# Patient Record
Sex: FEMALE | Race: WHITE | NOT HISPANIC OR LATINO | Employment: UNEMPLOYED | URBAN - NONMETROPOLITAN AREA
[De-identification: names, ages, dates, MRNs, and addresses within clinical notes are randomized per-mention and may not be internally consistent; named-entity substitution may affect disease eponyms.]

---

## 2018-01-01 ENCOUNTER — APPOINTMENT (OUTPATIENT)
Dept: GENERAL RADIOLOGY | Facility: HOSPITAL | Age: 73
End: 2018-01-01

## 2018-01-01 ENCOUNTER — HOSPITAL ENCOUNTER (INPATIENT)
Facility: HOSPITAL | Age: 73
LOS: 8 days | End: 2018-03-21
Attending: EMERGENCY MEDICINE | Admitting: INTERNAL MEDICINE

## 2018-01-01 ENCOUNTER — APPOINTMENT (OUTPATIENT)
Dept: CT IMAGING | Facility: HOSPITAL | Age: 73
End: 2018-01-01

## 2018-01-01 ENCOUNTER — HOSPITAL ENCOUNTER (OUTPATIENT)
Facility: HOSPITAL | Age: 73
Setting detail: HOSPITAL OUTPATIENT SURGERY
End: 2018-01-01
Attending: THORACIC SURGERY (CARDIOTHORACIC VASCULAR SURGERY) | Admitting: THORACIC SURGERY (CARDIOTHORACIC VASCULAR SURGERY)

## 2018-01-01 ENCOUNTER — EPISODE CHANGES (OUTPATIENT)
Dept: CASE MANAGEMENT | Facility: OTHER | Age: 73
End: 2018-01-01

## 2018-01-01 ENCOUNTER — PATIENT OUTREACH (OUTPATIENT)
Dept: CASE MANAGEMENT | Facility: OTHER | Age: 73
End: 2018-01-01

## 2018-01-01 ENCOUNTER — HOSPITAL ENCOUNTER (OUTPATIENT)
Dept: CT IMAGING | Facility: HOSPITAL | Age: 73
End: 2018-01-01
Attending: THORACIC SURGERY (CARDIOTHORACIC VASCULAR SURGERY)

## 2018-01-01 ENCOUNTER — PREP FOR SURGERY (OUTPATIENT)
Dept: OTHER | Facility: HOSPITAL | Age: 73
End: 2018-01-01

## 2018-01-01 ENCOUNTER — APPOINTMENT (OUTPATIENT)
Dept: CARDIOLOGY | Facility: HOSPITAL | Age: 73
End: 2018-01-01

## 2018-01-01 ENCOUNTER — OFFICE VISIT (OUTPATIENT)
Dept: CARDIAC SURGERY | Facility: CLINIC | Age: 73
End: 2018-01-01

## 2018-01-01 ENCOUNTER — APPOINTMENT (OUTPATIENT)
Dept: PREADMISSION TESTING | Facility: HOSPITAL | Age: 73
End: 2018-01-01

## 2018-01-01 ENCOUNTER — HOSPITAL ENCOUNTER (INPATIENT)
Facility: HOSPITAL | Age: 73
LOS: 2 days | Discharge: HOME HEALTH | End: 2018-03-10
Attending: INTERNAL MEDICINE | Admitting: HOSPITALIST

## 2018-01-01 ENCOUNTER — HOSPITAL ENCOUNTER (EMERGENCY)
Facility: HOSPITAL | Age: 73
Discharge: HOME | End: 2018-02-08
Attending: EMERGENCY MEDICINE | Admitting: EMERGENCY MEDICINE

## 2018-01-01 ENCOUNTER — TRANSCRIBE ORDERS (OUTPATIENT)
Dept: CARDIAC SURGERY | Facility: CLINIC | Age: 73
End: 2018-01-01

## 2018-01-01 VITALS
SYSTOLIC BLOOD PRESSURE: 120 MMHG | HEART RATE: 109 BPM | BODY MASS INDEX: 20.92 KG/M2 | HEIGHT: 62 IN | OXYGEN SATURATION: 94 % | DIASTOLIC BLOOD PRESSURE: 88 MMHG | WEIGHT: 113.7 LBS

## 2018-01-01 VITALS
HEIGHT: 62 IN | BODY MASS INDEX: 22.97 KG/M2 | OXYGEN SATURATION: 95 % | RESPIRATION RATE: 16 BRPM | DIASTOLIC BLOOD PRESSURE: 86 MMHG | WEIGHT: 124.8 LBS | TEMPERATURE: 98.5 F | SYSTOLIC BLOOD PRESSURE: 120 MMHG | HEART RATE: 80 BPM

## 2018-01-01 VITALS
WEIGHT: 113.6 LBS | HEIGHT: 62 IN | OXYGEN SATURATION: 93 % | DIASTOLIC BLOOD PRESSURE: 62 MMHG | BODY MASS INDEX: 20.91 KG/M2 | SYSTOLIC BLOOD PRESSURE: 74 MMHG | HEART RATE: 80 BPM | TEMPERATURE: 97.6 F

## 2018-01-01 VITALS
BODY MASS INDEX: 14.73 KG/M2 | HEART RATE: 76 BPM | WEIGHT: 78.04 LBS | DIASTOLIC BLOOD PRESSURE: 78 MMHG | OXYGEN SATURATION: 97 % | HEIGHT: 61 IN | TEMPERATURE: 98 F | RESPIRATION RATE: 18 BRPM | SYSTOLIC BLOOD PRESSURE: 140 MMHG

## 2018-01-01 DIAGNOSIS — J96.01 ACUTE RESPIRATORY FAILURE WITH HYPOXIA (HCC): ICD-10-CM

## 2018-01-01 DIAGNOSIS — J96.02 ACUTE RESPIRATORY FAILURE WITH HYPOXIA AND HYPERCAPNIA (HCC): ICD-10-CM

## 2018-01-01 DIAGNOSIS — J90 PLEURAL EFFUSION: ICD-10-CM

## 2018-01-01 DIAGNOSIS — J98.59 MEDIASTINAL MASS: Primary | ICD-10-CM

## 2018-01-01 DIAGNOSIS — R68.89 SUSPECTED DEEP TISSUE INJURY: ICD-10-CM

## 2018-01-01 DIAGNOSIS — J96.01 ACUTE RESPIRATORY FAILURE WITH HYPOXIA AND HYPERCAPNIA (HCC): ICD-10-CM

## 2018-01-01 DIAGNOSIS — Z74.09 IMPAIRED FUNCTIONAL MOBILITY, BALANCE, GAIT, AND ENDURANCE: Primary | ICD-10-CM

## 2018-01-01 DIAGNOSIS — J96.01 ACUTE HYPOXEMIC RESPIRATORY FAILURE (HCC): ICD-10-CM

## 2018-01-01 DIAGNOSIS — R22.2 CHEST MASS: Primary | ICD-10-CM

## 2018-01-01 DIAGNOSIS — J98.59 DISEASE OF MEDIASTINUM: Primary | ICD-10-CM

## 2018-01-01 DIAGNOSIS — J44.1 ACUTE EXACERBATION OF CHRONIC OBSTRUCTIVE PULMONARY DISEASE (COPD) (HCC): ICD-10-CM

## 2018-01-01 DIAGNOSIS — I50.9 ACUTE CONGESTIVE HEART FAILURE, UNSPECIFIED CONGESTIVE HEART FAILURE TYPE: ICD-10-CM

## 2018-01-01 DIAGNOSIS — J18.9 HEALTHCARE-ASSOCIATED PNEUMONIA: ICD-10-CM

## 2018-01-01 DIAGNOSIS — J44.1 CHRONIC OBSTRUCTIVE PULMONARY DISEASE WITH ACUTE EXACERBATION (HCC): ICD-10-CM

## 2018-01-01 DIAGNOSIS — J20.8 ACUTE BACTERIAL BRONCHITIS: Primary | ICD-10-CM

## 2018-01-01 DIAGNOSIS — R22.2 CHEST MASS: ICD-10-CM

## 2018-01-01 DIAGNOSIS — B96.89 ACUTE BACTERIAL BRONCHITIS: Primary | ICD-10-CM

## 2018-01-01 DIAGNOSIS — E43 SEVERE MALNUTRITION (HCC): ICD-10-CM

## 2018-01-01 LAB
027 TOXIN: NORMAL
A-A DO2: 10.9 MMHG (ref 0–300)
A-A DO2: 141.7 MMHG (ref 0–300)
A-A DO2: 142 MMHG (ref 0–300)
A-A DO2: 160.8 MMHG (ref 0–300)
A-A DO2: 163.7 MMHG (ref 0–300)
A-A DO2: 184 MMHG (ref 0–300)
A-A DO2: 19.4 MMHG (ref 0–300)
A-A DO2: 194.3 MMHG (ref 0–300)
A-A DO2: 69.7 MMHG (ref 0–300)
A-A DO2: 78.3 MMHG (ref 0–300)
A-A DO2: 78.7 MMHG (ref 0–300)
A-A DO2: 86.8 MMHG (ref 0–300)
A-A DO2: 88.5 MMHG (ref 0–300)
A-A DO2: 89.2 MMHG (ref 0–300)
A-A DO2: 93.9 MMHG (ref 0–300)
ALBUMIN FLD-MCNC: 1.2 G/DL
ALBUMIN SERPL-MCNC: 2.2 G/DL (ref 3.4–4.8)
ALBUMIN SERPL-MCNC: 2.3 G/DL (ref 3.4–4.8)
ALBUMIN SERPL-MCNC: 2.5 G/DL (ref 3.2–4.8)
ALBUMIN SERPL-MCNC: 2.5 G/DL (ref 3.4–4.8)
ALBUMIN SERPL-MCNC: 2.6 G/DL (ref 3.4–4.8)
ALBUMIN SERPL-MCNC: 2.6 G/DL (ref 3.4–4.8)
ALBUMIN SERPL-MCNC: 2.7 G/DL (ref 3.2–4.8)
ALBUMIN SERPL-MCNC: 2.8 G/DL (ref 3.4–4.8)
ALBUMIN SERPL-MCNC: 3 G/DL (ref 3.4–4.8)
ALBUMIN SERPL-MCNC: 3.3 G/DL (ref 3.2–4.8)
ALBUMIN SERPL-MCNC: 4.6 G/DL (ref 3.4–4.8)
ALBUMIN/GLOB SERPL: 0.9 G/DL (ref 1.5–2.5)
ALBUMIN/GLOB SERPL: 0.9 G/DL (ref 1.5–2.5)
ALBUMIN/GLOB SERPL: 1 G/DL (ref 1.5–2.5)
ALBUMIN/GLOB SERPL: 1.1 G/DL (ref 1.5–2.5)
ALBUMIN/GLOB SERPL: 1.1 G/DL (ref 1.5–2.5)
ALBUMIN/GLOB SERPL: 1.2 G/DL (ref 1.5–2.5)
ALBUMIN/GLOB SERPL: 1.4 G/DL (ref 1.5–2.5)
ALP SERPL-CCNC: 114 U/L (ref 25–100)
ALP SERPL-CCNC: 131 U/L (ref 35–104)
ALP SERPL-CCNC: 70 U/L (ref 35–104)
ALP SERPL-CCNC: 74 U/L (ref 35–104)
ALP SERPL-CCNC: 76 U/L (ref 35–104)
ALP SERPL-CCNC: 80 U/L (ref 35–104)
ALP SERPL-CCNC: 80 U/L (ref 35–104)
ALP SERPL-CCNC: 81 U/L (ref 35–104)
ALP SERPL-CCNC: 86 U/L (ref 35–104)
ALP SERPL-CCNC: 92 U/L (ref 25–100)
ALT SERPL W P-5'-P-CCNC: 108 U/L (ref 10–36)
ALT SERPL W P-5'-P-CCNC: 48 U/L (ref 10–36)
ALT SERPL W P-5'-P-CCNC: 59 U/L (ref 10–36)
ALT SERPL W P-5'-P-CCNC: 61 U/L (ref 10–36)
ALT SERPL W P-5'-P-CCNC: 64 U/L (ref 10–36)
ALT SERPL W P-5'-P-CCNC: 69 U/L (ref 7–40)
ALT SERPL W P-5'-P-CCNC: 74 U/L (ref 10–36)
ALT SERPL W P-5'-P-CCNC: 81 U/L (ref 10–36)
ALT SERPL W P-5'-P-CCNC: 86 U/L (ref 10–36)
ALT SERPL W P-5'-P-CCNC: 88 U/L (ref 7–40)
AMORPH URATE CRY URNS QL MICRO: ABNORMAL /HPF
ANION GAP SERPL CALCULATED.3IONS-SCNC: 2.6 MMOL/L (ref 3.6–11.2)
ANION GAP SERPL CALCULATED.3IONS-SCNC: 3 MMOL/L (ref 3–11)
ANION GAP SERPL CALCULATED.3IONS-SCNC: 4 MMOL/L (ref 3.6–11.2)
ANION GAP SERPL CALCULATED.3IONS-SCNC: 5.6 MMOL/L (ref 3.6–11.2)
ANION GAP SERPL CALCULATED.3IONS-SCNC: 6 MMOL/L (ref 3–11)
ANION GAP SERPL CALCULATED.3IONS-SCNC: 6.5 MMOL/L (ref 3.6–11.2)
ANION GAP SERPL CALCULATED.3IONS-SCNC: 6.7 MMOL/L (ref 3.6–11.2)
ANION GAP SERPL CALCULATED.3IONS-SCNC: 7.4 MMOL/L (ref 3.6–11.2)
ANION GAP SERPL CALCULATED.3IONS-SCNC: 7.7 MMOL/L (ref 3.6–11.2)
ANION GAP SERPL CALCULATED.3IONS-SCNC: 8 MMOL/L (ref 3–11)
ANION GAP SERPL CALCULATED.3IONS-SCNC: 8.2 MMOL/L (ref 3.6–11.2)
APPEARANCE FLD: ABNORMAL
APTT PPP: 20.5 SECONDS (ref 23.8–36.1)
APTT PPP: 26.6 SECONDS (ref 24–31)
ARTERIAL PATENCY WRIST A: ABNORMAL
ARTERIAL PATENCY WRIST A: POSITIVE
AST SERPL-CCNC: 28 U/L (ref 10–30)
AST SERPL-CCNC: 29 U/L (ref 10–30)
AST SERPL-CCNC: 29 U/L (ref 10–30)
AST SERPL-CCNC: 40 U/L (ref 10–30)
AST SERPL-CCNC: 42 U/L (ref 10–30)
AST SERPL-CCNC: 49 U/L (ref 10–30)
AST SERPL-CCNC: 52 U/L (ref 0–33)
AST SERPL-CCNC: 52 U/L (ref 10–30)
AST SERPL-CCNC: 54 U/L (ref 0–33)
AST SERPL-CCNC: 91 U/L (ref 10–30)
ATMOSPHERIC PRESS: 722 MMHG
ATMOSPHERIC PRESS: 723 MMHG
ATMOSPHERIC PRESS: 726 MMHG
ATMOSPHERIC PRESS: 727 MMHG
ATMOSPHERIC PRESS: 727 MMHG
ATMOSPHERIC PRESS: 729 MMHG
ATMOSPHERIC PRESS: 732 MMHG
BACTERIA FLD CULT: NORMAL
BACTERIA SPEC AEROBE CULT: ABNORMAL
BACTERIA SPEC AEROBE CULT: ABNORMAL
BACTERIA SPEC AEROBE CULT: NO GROWTH
BACTERIA SPEC AEROBE CULT: NORMAL
BACTERIA SPEC ANAEROBE CULT: NORMAL
BACTERIA SPEC RESP CULT: ABNORMAL
BACTERIA UR QL AUTO: ABNORMAL /HPF
BACTERIA UR QL AUTO: ABNORMAL /HPF
BACTERIA UR QL AUTO: NORMAL /HPF
BASE EXCESS BLDA CALC-SCNC: -0.4 MMOL/L
BASE EXCESS BLDA CALC-SCNC: -2.4 MMOL/L
BASE EXCESS BLDA CALC-SCNC: -2.7 MMOL/L
BASE EXCESS BLDA CALC-SCNC: -4 MMOL/L
BASE EXCESS BLDA CALC-SCNC: -5.1 MMOL/L
BASE EXCESS BLDA CALC-SCNC: 0.7 MMOL/L
BASE EXCESS BLDA CALC-SCNC: 3.4 MMOL/L
BASE EXCESS BLDA CALC-SCNC: 6.8 MMOL/L
BASE EXCESS BLDA CALC-SCNC: 7.1 MMOL/L
BASE EXCESS BLDA CALC-SCNC: 7.2 MMOL/L
BASE EXCESS BLDA CALC-SCNC: 7.3 MMOL/L
BASE EXCESS BLDA CALC-SCNC: 7.4 MMOL/L
BASE EXCESS BLDA CALC-SCNC: 8.1 MMOL/L
BASE EXCESS BLDV CALC-SCNC: -5.7 MMOL/L
BASE EXCESS BLDV CALC-SCNC: 5.9 MMOL/L
BASOPHILS # BLD AUTO: 0 10*3/MM3 (ref 0–0.3)
BASOPHILS # BLD AUTO: 0.01 10*3/MM3 (ref 0–0.3)
BASOPHILS # BLD AUTO: 0.02 10*3/MM3 (ref 0–0.2)
BASOPHILS # BLD AUTO: 0.03 10*3/MM3 (ref 0–0.3)
BASOPHILS NFR BLD AUTO: 0 % (ref 0–2)
BASOPHILS NFR BLD AUTO: 0.1 % (ref 0–2)
BASOPHILS NFR BLD AUTO: 0.2 % (ref 0–2)
BASOPHILS NFR BLD AUTO: 0.3 % (ref 0–1)
BDY SITE: ABNORMAL
BDY SITE: NORMAL
BDY SITE: NORMAL
BH CV ECHO MEAS - BSA(HAYCOCK): 1.5 M^2
BH CV ECHO MEAS - BSA: 1.5 M^2
BH CV ECHO MEAS - BZI_BMI: 21.8 KILOGRAMS/M^2
BH CV ECHO MEAS - BZI_METRIC_HEIGHT: 157.5 CM
BH CV ECHO MEAS - BZI_METRIC_WEIGHT: 54 KG
BH CV ECHO MEAS - CONTRAST EF (2CH): 57.9 ML/M^2
BH CV ECHO MEAS - CONTRAST EF 4CH: 62.5 ML/M^2
BH CV ECHO MEAS - EDV(CUBED): 22.3 ML
BH CV ECHO MEAS - EDV(MOD-SP2): 57 ML
BH CV ECHO MEAS - EDV(MOD-SP4): 56 ML
BH CV ECHO MEAS - EDV(TEICH): 30 ML
BH CV ECHO MEAS - EF(CUBED): 74.2 %
BH CV ECHO MEAS - EF(MOD-SP2): 57.9 %
BH CV ECHO MEAS - EF(MOD-SP4): 62 %
BH CV ECHO MEAS - EF(TEICH): 67.9 %
BH CV ECHO MEAS - ESV(CUBED): 5.8 ML
BH CV ECHO MEAS - ESV(MOD-SP2): 24 ML
BH CV ECHO MEAS - ESV(MOD-SP4): 21 ML
BH CV ECHO MEAS - ESV(TEICH): 9.6 ML
BH CV ECHO MEAS - FS: 36.3 %
BH CV ECHO MEAS - IVS/LVPW: 0.99
BH CV ECHO MEAS - IVSD: 1.3 CM
BH CV ECHO MEAS - LAT PEAK E' VEL: 6.6 CM/SEC
BH CV ECHO MEAS - LV DIASTOLIC VOL/BSA (35-75): 36.5 ML/M^2
BH CV ECHO MEAS - LV MASS(C)D: 108.2 GRAMS
BH CV ECHO MEAS - LV MASS(C)DI: 70.6 GRAMS/M^2
BH CV ECHO MEAS - LV SYSTOLIC VOL/BSA (12-30): 13.7 ML/M^2
BH CV ECHO MEAS - LVIDD: 2.8 CM
BH CV ECHO MEAS - LVIDS: 1.8 CM
BH CV ECHO MEAS - LVLD AP2: 5.7 CM
BH CV ECHO MEAS - LVLD AP4: 5.8 CM
BH CV ECHO MEAS - LVLS AP2: 5 CM
BH CV ECHO MEAS - LVLS AP4: 4.8 CM
BH CV ECHO MEAS - LVPWD: 1.3 CM
BH CV ECHO MEAS - MED PEAK E' VEL: 5.7 CM/SEC
BH CV ECHO MEAS - MV A MAX VEL: 77 CM/SEC
BH CV ECHO MEAS - MV DEC SLOPE: 483.8 CM/SEC^2
BH CV ECHO MEAS - MV DEC TIME: 0.2 SEC
BH CV ECHO MEAS - MV E MAX VEL: 59.7 CM/SEC
BH CV ECHO MEAS - MV E/A: 0.78
BH CV ECHO MEAS - PA ACC SLOPE: 623 CM/SEC^2
BH CV ECHO MEAS - PA ACC TIME: 0.09 SEC
BH CV ECHO MEAS - PA PR(ACCEL): 39.8 MMHG
BH CV ECHO MEAS - RAP SYSTOLE: 10 MMHG
BH CV ECHO MEAS - RVSP: 44 MMHG
BH CV ECHO MEAS - SI(CUBED): 10.8 ML/M^2
BH CV ECHO MEAS - SI(MOD-SP2): 21.5 ML/M^2
BH CV ECHO MEAS - SI(MOD-SP4): 22.8 ML/M^2
BH CV ECHO MEAS - SI(TEICH): 13.3 ML/M^2
BH CV ECHO MEAS - SV(CUBED): 16.6 ML
BH CV ECHO MEAS - SV(MOD-SP2): 33 ML
BH CV ECHO MEAS - SV(MOD-SP4): 35 ML
BH CV ECHO MEAS - SV(TEICH): 20.4 ML
BH CV ECHO MEAS - TR MAX VEL: 291.5 CM/SEC
BH CV XLRA - RV BASE: 2.7 CM
BH CV XLRA - RV LENGTH: 5.4 CM
BH CV XLRA - RV MID: 2.6 CM
BILIRUB CONJ SERPL-MCNC: 0.2 MG/DL (ref 0–0.2)
BILIRUB CONJ SERPL-MCNC: 0.2 MG/DL (ref 0–0.2)
BILIRUB SERPL-MCNC: 0.2 MG/DL (ref 0.2–1.8)
BILIRUB SERPL-MCNC: 0.2 MG/DL (ref 0.3–1.2)
BILIRUB SERPL-MCNC: 0.3 MG/DL (ref 0.2–1.8)
BILIRUB SERPL-MCNC: 0.4 MG/DL (ref 0.2–1.8)
BILIRUB SERPL-MCNC: 0.4 MG/DL (ref 0.2–1.8)
BILIRUB SERPL-MCNC: 0.4 MG/DL (ref 0.3–1.2)
BILIRUB SERPL-MCNC: 0.5 MG/DL (ref 0.2–1.8)
BILIRUB UR QL STRIP: NEGATIVE
BNP SERPL-MCNC: 103 PG/ML (ref 0–100)
BNP SERPL-MCNC: 106 PG/ML (ref 0–100)
BNP SERPL-MCNC: 130 PG/ML (ref 0–100)
BNP SERPL-MCNC: 138 PG/ML (ref 0–100)
BNP SERPL-MCNC: 260 PG/ML (ref 0–100)
BNP SERPL-MCNC: 606 PG/ML (ref 0–100)
BODY TEMPERATURE: 98.6 C
BUN BLD-MCNC: 11 MG/DL (ref 9–23)
BUN BLD-MCNC: 12 MG/DL (ref 7–21)
BUN BLD-MCNC: 12 MG/DL (ref 7–21)
BUN BLD-MCNC: 15 MG/DL (ref 7–21)
BUN BLD-MCNC: 15 MG/DL (ref 7–21)
BUN BLD-MCNC: 15 MG/DL (ref 9–23)
BUN BLD-MCNC: 16 MG/DL (ref 7–21)
BUN BLD-MCNC: 16 MG/DL (ref 9–23)
BUN BLD-MCNC: 18 MG/DL (ref 7–21)
BUN BLD-MCNC: 24 MG/DL (ref 7–21)
BUN BLD-MCNC: 28 MG/DL (ref 7–21)
BUN/CREAT SERPL: 15 (ref 7–25)
BUN/CREAT SERPL: 15.5 (ref 7–25)
BUN/CREAT SERPL: 15.7 (ref 7–25)
BUN/CREAT SERPL: 19 (ref 7–25)
BUN/CREAT SERPL: 22.9 (ref 7–25)
BUN/CREAT SERPL: 23.9 (ref 7–25)
BUN/CREAT SERPL: 24 (ref 7–25)
BUN/CREAT SERPL: 26.3 (ref 7–25)
BUN/CREAT SERPL: 34.6 (ref 7–25)
BUN/CREAT SERPL: 37.5 (ref 7–25)
BUN/CREAT SERPL: 43.8 (ref 7–25)
C DIFF TOX GENS STL QL NAA+PROBE: NEGATIVE
CA-I SERPL ISE-MCNC: 1.22 MMOL/L (ref 1.12–1.32)
CALCIUM SPEC-SCNC: 7.5 MG/DL (ref 8.7–10.4)
CALCIUM SPEC-SCNC: 7.8 MG/DL (ref 7.7–10)
CALCIUM SPEC-SCNC: 7.8 MG/DL (ref 8.7–10.4)
CALCIUM SPEC-SCNC: 7.9 MG/DL (ref 7.7–10)
CALCIUM SPEC-SCNC: 7.9 MG/DL (ref 7.7–10)
CALCIUM SPEC-SCNC: 8 MG/DL (ref 7.7–10)
CALCIUM SPEC-SCNC: 8 MG/DL (ref 7.7–10)
CALCIUM SPEC-SCNC: 8.1 MG/DL (ref 7.7–10)
CALCIUM SPEC-SCNC: 8.2 MG/DL (ref 7.7–10)
CALCIUM SPEC-SCNC: 8.7 MG/DL (ref 8.7–10.4)
CALCIUM SPEC-SCNC: 9.3 MG/DL (ref 7.7–10)
CHLORIDE SERPL-SCNC: 102 MMOL/L (ref 99–112)
CHLORIDE SERPL-SCNC: 103 MMOL/L (ref 99–112)
CHLORIDE SERPL-SCNC: 104 MMOL/L (ref 99–112)
CHLORIDE SERPL-SCNC: 105 MMOL/L (ref 99–109)
CHLORIDE SERPL-SCNC: 107 MMOL/L (ref 99–112)
CHLORIDE SERPL-SCNC: 111 MMOL/L (ref 99–112)
CHLORIDE SERPL-SCNC: 114 MMOL/L (ref 99–112)
CHLORIDE SERPL-SCNC: 116 MMOL/L (ref 99–112)
CHLORIDE SERPL-SCNC: 85 MMOL/L (ref 99–112)
CHLORIDE SERPL-SCNC: 98 MMOL/L (ref 99–109)
CHLORIDE SERPL-SCNC: 98 MMOL/L (ref 99–109)
CHOLEST FLD-MCNC: 29 MG/DL
CK MB SERPL-CCNC: 0.49 NG/ML (ref 0–5)
CK MB SERPL-CCNC: <0.18 NG/ML (ref 0–5)
CK MB SERPL-CCNC: <0.18 NG/ML (ref 0–5)
CK MB SERPL-RTO: 2.2 % (ref 0–3)
CK MB SERPL-RTO: NORMAL % (ref 0–3)
CK MB SERPL-RTO: NORMAL % (ref 0–3)
CK SERPL-CCNC: 16 U/L (ref 24–173)
CK SERPL-CCNC: 21 U/L (ref 24–173)
CK SERPL-CCNC: 22 U/L (ref 24–173)
CK SERPL-CCNC: 96 U/L (ref 24–173)
CLARITY UR: ABNORMAL
CLARITY UR: ABNORMAL
CLARITY UR: CLEAR
CO2 SERPL-SCNC: 21.3 MMOL/L (ref 24.3–31.9)
CO2 SERPL-SCNC: 21.4 MMOL/L (ref 24.3–31.9)
CO2 SERPL-SCNC: 21.5 MMOL/L (ref 24.3–31.9)
CO2 SERPL-SCNC: 26.3 MMOL/L (ref 24.3–31.9)
CO2 SERPL-SCNC: 26.4 MMOL/L (ref 24.3–31.9)
CO2 SERPL-SCNC: 30 MMOL/L (ref 20–31)
CO2 SERPL-SCNC: 30.6 MMOL/L (ref 24.3–31.9)
CO2 SERPL-SCNC: 33 MMOL/L (ref 20–31)
CO2 SERPL-SCNC: 35 MMOL/L (ref 20–31)
CO2 SERPL-SCNC: 35 MMOL/L (ref 24.3–31.9)
CO2 SERPL-SCNC: 36.8 MMOL/L (ref 24.3–31.9)
COHGB MFR BLD: 0.8 % (ref 0–5)
COHGB MFR BLD: 1 % (ref 0–5)
COHGB MFR BLD: 1.1 % (ref 0–5)
COHGB MFR BLD: 1.2 % (ref 0–5)
COHGB MFR BLD: 1.4 % (ref 0–5)
COHGB MFR BLD: 1.5 % (ref 0–5)
COHGB MFR BLD: 1.6 % (ref 0–5)
COHGB MFR BLD: 1.8 % (ref 0–5)
COHGB MFR BLD: 1.8 % (ref 0–5)
COHGB MFR BLD: 1.9 % (ref 0–5)
COHGB MFR BLD: 2 % (ref 0–5)
COLOR FLD: YELLOW
COLOR UR: ABNORMAL
COLOR UR: YELLOW
COLOR UR: YELLOW
CREAT BLD-MCNC: 0.5 MG/DL (ref 0.43–1.29)
CREAT BLD-MCNC: 0.52 MG/DL (ref 0.43–1.29)
CREAT BLD-MCNC: 0.57 MG/DL (ref 0.43–1.29)
CREAT BLD-MCNC: 0.63 MG/DL (ref 0.43–1.29)
CREAT BLD-MCNC: 0.64 MG/DL (ref 0.43–1.29)
CREAT BLD-MCNC: 0.64 MG/DL (ref 0.43–1.29)
CREAT BLD-MCNC: 0.67 MG/DL (ref 0.43–1.29)
CREAT BLD-MCNC: 0.7 MG/DL (ref 0.6–1.3)
CREAT BLD-MCNC: 0.7 MG/DL (ref 0.6–1.3)
CREAT BLD-MCNC: 0.97 MG/DL (ref 0.43–1.29)
CREAT BLD-MCNC: 1 MG/DL (ref 0.6–1.3)
CRP SERPL-MCNC: 1.34 MG/DL (ref 0–0.99)
CRP SERPL-MCNC: 11.14 MG/DL (ref 0–0.99)
CRP SERPL-MCNC: 11.32 MG/DL (ref 0–0.99)
CRP SERPL-MCNC: 3.11 MG/DL (ref 0–0.99)
CRP SERPL-MCNC: 3.42 MG/DL (ref 0–0.99)
CRP SERPL-MCNC: 4.79 MG/DL (ref 0–0.99)
CRP SERPL-MCNC: 6.44 MG/DL (ref 0–0.99)
CRP SERPL-MCNC: 6.68 MG/DL (ref 0–0.99)
D DIMER PPP FEU-MCNC: 8.31 MCGFEU/ML (ref 0–0.5)
D-LACTATE SERPL-SCNC: 0.9 MMOL/L (ref 0.5–2)
D-LACTATE SERPL-SCNC: 1.8 MMOL/L (ref 0.5–2)
D-LACTATE SERPL-SCNC: 1.9 MMOL/L (ref 0.5–2)
D-LACTATE SERPL-SCNC: 2.1 MMOL/L (ref 0.5–2)
DEPRECATED RDW RBC AUTO: 41.9 FL (ref 37–54)
DEPRECATED RDW RBC AUTO: 49 FL (ref 37–54)
DEPRECATED RDW RBC AUTO: 49.1 FL (ref 37–54)
DEPRECATED RDW RBC AUTO: 49.7 FL (ref 37–54)
DEPRECATED RDW RBC AUTO: 50 FL (ref 37–54)
DEPRECATED RDW RBC AUTO: 50.9 FL (ref 37–54)
DEPRECATED RDW RBC AUTO: 51.9 FL (ref 37–54)
DEPRECATED RDW RBC AUTO: 52.2 FL (ref 37–54)
DEPRECATED RDW RBC AUTO: 52.9 FL (ref 37–54)
DEPRECATED RDW RBC AUTO: 53.5 FL (ref 37–54)
DEPRECATED RDW RBC AUTO: 54 FL (ref 37–54)
E/E' RATIO: 6.1
EOSINOPHIL # BLD AUTO: 0 10*3/MM3 (ref 0–0.7)
EOSINOPHIL # BLD AUTO: 0.01 10*3/MM3 (ref 0–0.3)
EOSINOPHIL # BLD AUTO: 0.03 10*3/MM3 (ref 0–0.7)
EOSINOPHIL # BLD AUTO: 0.04 10*3/MM3 (ref 0–0.7)
EOSINOPHIL # BLD AUTO: 0.15 10*3/MM3 (ref 0–0.7)
EOSINOPHIL # BLD AUTO: 0.23 10*3/MM3 (ref 0–0.7)
EOSINOPHIL NFR BLD AUTO: 0 % (ref 0–7)
EOSINOPHIL NFR BLD AUTO: 0.2 % (ref 0–3)
EOSINOPHIL NFR BLD AUTO: 0.3 % (ref 0–7)
EOSINOPHIL NFR BLD AUTO: 0.4 % (ref 0–7)
EOSINOPHIL NFR BLD AUTO: 1.3 % (ref 0–7)
EOSINOPHIL NFR BLD AUTO: 1.8 % (ref 0–7)
ERYTHROCYTE [DISTWIDTH] IN BLOOD BY AUTOMATED COUNT: 12.5 % (ref 11.5–14.5)
ERYTHROCYTE [DISTWIDTH] IN BLOOD BY AUTOMATED COUNT: 13.7 % (ref 11.3–14.5)
ERYTHROCYTE [DISTWIDTH] IN BLOOD BY AUTOMATED COUNT: 14 % (ref 11.3–14.5)
ERYTHROCYTE [DISTWIDTH] IN BLOOD BY AUTOMATED COUNT: 14 % (ref 11.5–14.5)
ERYTHROCYTE [DISTWIDTH] IN BLOOD BY AUTOMATED COUNT: 14.1 % (ref 11.3–14.5)
ERYTHROCYTE [DISTWIDTH] IN BLOOD BY AUTOMATED COUNT: 14.3 % (ref 11.5–14.5)
ERYTHROCYTE [DISTWIDTH] IN BLOOD BY AUTOMATED COUNT: 14.4 % (ref 11.5–14.5)
ERYTHROCYTE [DISTWIDTH] IN BLOOD BY AUTOMATED COUNT: 14.7 % (ref 11.5–14.5)
ERYTHROCYTE [DISTWIDTH] IN BLOOD BY AUTOMATED COUNT: 14.7 % (ref 11.5–14.5)
ERYTHROCYTE [DISTWIDTH] IN BLOOD BY AUTOMATED COUNT: 15.3 % (ref 11.5–14.5)
ERYTHROCYTE [DISTWIDTH] IN BLOOD BY AUTOMATED COUNT: 15.5 % (ref 11.5–14.5)
FLUAV AG NPH QL: NEGATIVE
FLUAV AG NPH QL: NEGATIVE
FLUBV AG NPH QL IA: NEGATIVE
FLUBV AG NPH QL IA: NEGATIVE
FOLATE SERPL-MCNC: 19.42 NG/ML (ref 5.4–20)
GAS FLOW AIRWAY: 3 LPM
GFR SERPL CREATININE-BSD FRML MDRD: 105 ML/MIN/1.73
GFR SERPL CREATININE-BSD FRML MDRD: 116 ML/MIN/1.73
GFR SERPL CREATININE-BSD FRML MDRD: 122 ML/MIN/1.73
GFR SERPL CREATININE-BSD FRML MDRD: 55 ML/MIN/1.73
GFR SERPL CREATININE-BSD FRML MDRD: 57 ML/MIN/1.73
GFR SERPL CREATININE-BSD FRML MDRD: 82 ML/MIN/1.73
GFR SERPL CREATININE-BSD FRML MDRD: 82 ML/MIN/1.73
GFR SERPL CREATININE-BSD FRML MDRD: 87 ML/MIN/1.73
GFR SERPL CREATININE-BSD FRML MDRD: 91 ML/MIN/1.73
GFR SERPL CREATININE-BSD FRML MDRD: 91 ML/MIN/1.73
GFR SERPL CREATININE-BSD FRML MDRD: 93 ML/MIN/1.73
GLOBULIN UR ELPH-MCNC: 2.1 GM/DL
GLOBULIN UR ELPH-MCNC: 2.4 GM/DL
GLOBULIN UR ELPH-MCNC: 2.4 GM/DL
GLOBULIN UR ELPH-MCNC: 2.7 GM/DL
GLOBULIN UR ELPH-MCNC: 2.7 GM/DL
GLOBULIN UR ELPH-MCNC: 2.8 GM/DL
GLOBULIN UR ELPH-MCNC: 2.9 GM/DL
GLOBULIN UR ELPH-MCNC: 3.2 GM/DL
GLUCOSE BLD-MCNC: 127 MG/DL (ref 70–110)
GLUCOSE BLD-MCNC: 153 MG/DL (ref 70–100)
GLUCOSE BLD-MCNC: 158 MG/DL (ref 70–110)
GLUCOSE BLD-MCNC: 159 MG/DL (ref 70–110)
GLUCOSE BLD-MCNC: 160 MG/DL (ref 70–110)
GLUCOSE BLD-MCNC: 167 MG/DL (ref 70–110)
GLUCOSE BLD-MCNC: 169 MG/DL (ref 70–110)
GLUCOSE BLD-MCNC: 181 MG/DL (ref 70–100)
GLUCOSE BLD-MCNC: 197 MG/DL (ref 70–110)
GLUCOSE BLD-MCNC: 213 MG/DL (ref 70–110)
GLUCOSE BLD-MCNC: 91 MG/DL (ref 70–100)
GLUCOSE BLDC GLUCOMTR-MCNC: 105 MG/DL (ref 70–130)
GLUCOSE BLDC GLUCOMTR-MCNC: 110 MG/DL (ref 70–130)
GLUCOSE BLDC GLUCOMTR-MCNC: 113 MG/DL (ref 70–130)
GLUCOSE BLDC GLUCOMTR-MCNC: 114 MG/DL (ref 70–130)
GLUCOSE BLDC GLUCOMTR-MCNC: 116 MG/DL (ref 70–130)
GLUCOSE BLDC GLUCOMTR-MCNC: 118 MG/DL (ref 70–130)
GLUCOSE BLDC GLUCOMTR-MCNC: 118 MG/DL (ref 70–130)
GLUCOSE BLDC GLUCOMTR-MCNC: 120 MG/DL (ref 70–130)
GLUCOSE BLDC GLUCOMTR-MCNC: 121 MG/DL (ref 70–130)
GLUCOSE BLDC GLUCOMTR-MCNC: 124 MG/DL (ref 70–130)
GLUCOSE BLDC GLUCOMTR-MCNC: 125 MG/DL (ref 70–130)
GLUCOSE BLDC GLUCOMTR-MCNC: 127 MG/DL (ref 70–130)
GLUCOSE BLDC GLUCOMTR-MCNC: 129 MG/DL (ref 70–130)
GLUCOSE BLDC GLUCOMTR-MCNC: 136 MG/DL (ref 70–130)
GLUCOSE BLDC GLUCOMTR-MCNC: 137 MG/DL (ref 70–130)
GLUCOSE BLDC GLUCOMTR-MCNC: 137 MG/DL (ref 70–130)
GLUCOSE BLDC GLUCOMTR-MCNC: 139 MG/DL (ref 70–130)
GLUCOSE BLDC GLUCOMTR-MCNC: 139 MG/DL (ref 70–130)
GLUCOSE BLDC GLUCOMTR-MCNC: 140 MG/DL (ref 70–130)
GLUCOSE BLDC GLUCOMTR-MCNC: 141 MG/DL (ref 70–130)
GLUCOSE BLDC GLUCOMTR-MCNC: 152 MG/DL (ref 70–130)
GLUCOSE BLDC GLUCOMTR-MCNC: 156 MG/DL (ref 70–130)
GLUCOSE BLDC GLUCOMTR-MCNC: 160 MG/DL (ref 70–130)
GLUCOSE BLDC GLUCOMTR-MCNC: 160 MG/DL (ref 70–130)
GLUCOSE BLDC GLUCOMTR-MCNC: 162 MG/DL (ref 70–130)
GLUCOSE BLDC GLUCOMTR-MCNC: 166 MG/DL (ref 70–130)
GLUCOSE BLDC GLUCOMTR-MCNC: 169 MG/DL (ref 70–130)
GLUCOSE BLDC GLUCOMTR-MCNC: 172 MG/DL (ref 70–130)
GLUCOSE BLDC GLUCOMTR-MCNC: 197 MG/DL (ref 70–130)
GLUCOSE BLDC GLUCOMTR-MCNC: 212 MG/DL (ref 70–130)
GLUCOSE BLDC GLUCOMTR-MCNC: 93 MG/DL (ref 70–130)
GLUCOSE BLDC GLUCOMTR-MCNC: 94 MG/DL (ref 70–130)
GLUCOSE BLDC GLUCOMTR-MCNC: 95 MG/DL (ref 70–130)
GLUCOSE FLD-MCNC: 130 MG/DL
GLUCOSE UR STRIP-MCNC: ABNORMAL MG/DL
GLUCOSE UR STRIP-MCNC: NEGATIVE MG/DL
GLUCOSE UR STRIP-MCNC: NEGATIVE MG/DL
GRAM STN SPEC: ABNORMAL
GRAM STN SPEC: NORMAL
GRAM STN SPEC: NORMAL
HBA1C MFR BLD: 6.2 % (ref 4.8–5.6)
HCO3 BLDA-SCNC: 21.6 MMOL/L (ref 22–26)
HCO3 BLDA-SCNC: 22.4 MMOL/L (ref 22–26)
HCO3 BLDA-SCNC: 22.4 MMOL/L (ref 22–26)
HCO3 BLDA-SCNC: 22.9 MMOL/L (ref 22–26)
HCO3 BLDA-SCNC: 24.2 MMOL/L (ref 22–26)
HCO3 BLDA-SCNC: 27.3 MMOL/L (ref 22–26)
HCO3 BLDA-SCNC: 27.4 MMOL/L (ref 22–26)
HCO3 BLDA-SCNC: 30.5 MMOL/L (ref 22–26)
HCO3 BLDA-SCNC: 31.5 MMOL/L (ref 22–26)
HCO3 BLDA-SCNC: 35.9 MMOL/L (ref 22–26)
HCO3 BLDA-SCNC: 37 MMOL/L (ref 22–26)
HCO3 BLDA-SCNC: 38.3 MMOL/L (ref 22–26)
HCO3 BLDA-SCNC: 41.4 MMOL/L (ref 22–26)
HCO3 BLDV-SCNC: 24.3 MMOL/L
HCO3 BLDV-SCNC: 29.7 MMOL/L
HCT VFR BLD AUTO: 35.8 % (ref 37–47)
HCT VFR BLD AUTO: 37 % (ref 37–47)
HCT VFR BLD AUTO: 37.1 % (ref 37–47)
HCT VFR BLD AUTO: 37.8 % (ref 34.5–44)
HCT VFR BLD AUTO: 39.6 % (ref 37–47)
HCT VFR BLD AUTO: 40.2 % (ref 34.5–44)
HCT VFR BLD AUTO: 40.3 % (ref 37–47)
HCT VFR BLD AUTO: 40.4 % (ref 37–47)
HCT VFR BLD AUTO: 41.2 % (ref 37–47)
HCT VFR BLD AUTO: 43 % (ref 34.5–44)
HCT VFR BLD AUTO: 44.9 % (ref 37–47)
HCT VFR BLD CALC: 35 % (ref 37–47)
HCT VFR BLD CALC: 36 % (ref 37–47)
HCT VFR BLD CALC: 36 % (ref 37–47)
HCT VFR BLD CALC: 38 % (ref 37–47)
HCT VFR BLD CALC: 39 % (ref 37–47)
HCT VFR BLD CALC: 39 % (ref 37–47)
HCT VFR BLD CALC: 41 % (ref 37–47)
HCT VFR BLD CALC: 42 % (ref 37–47)
HEMOCCULT STL QL: POSITIVE
HGB BLD-MCNC: 11.4 G/DL (ref 12–16)
HGB BLD-MCNC: 11.4 G/DL (ref 12–16)
HGB BLD-MCNC: 11.5 G/DL (ref 11.5–15.5)
HGB BLD-MCNC: 11.6 G/DL (ref 12–16)
HGB BLD-MCNC: 12.1 G/DL (ref 12–16)
HGB BLD-MCNC: 12.2 G/DL (ref 11.5–15.5)
HGB BLD-MCNC: 12.7 G/DL (ref 12–16)
HGB BLD-MCNC: 13 G/DL (ref 12–16)
HGB BLD-MCNC: 13.2 G/DL (ref 12–16)
HGB BLD-MCNC: 13.7 G/DL (ref 11.5–15.5)
HGB BLD-MCNC: 14.3 G/DL (ref 12–16)
HGB BLDA-MCNC: 12 G/DL (ref 12–16)
HGB BLDA-MCNC: 12.1 G/DL (ref 12–16)
HGB BLDA-MCNC: 12.2 G/DL (ref 12–16)
HGB BLDA-MCNC: 12.4 G/DL (ref 12–16)
HGB BLDA-MCNC: 12.8 G/DL (ref 12–16)
HGB BLDA-MCNC: 12.9 G/DL (ref 12–16)
HGB BLDA-MCNC: 12.9 G/DL (ref 12–16)
HGB BLDA-MCNC: 13 G/DL (ref 12–16)
HGB BLDA-MCNC: 13.1 G/DL (ref 12–16)
HGB BLDA-MCNC: 13.4 G/DL (ref 12–16)
HGB BLDA-MCNC: 13.7 G/DL (ref 12–16)
HGB BLDA-MCNC: 13.8 G/DL (ref 12–16)
HGB BLDA-MCNC: 14.2 G/DL (ref 12–16)
HGB BLDA-MCNC: 14.3 G/DL (ref 12–16)
HGB BLDA-MCNC: 14.3 G/DL (ref 12–16)
HGB UR QL STRIP.AUTO: ABNORMAL
HGB UR QL STRIP.AUTO: NEGATIVE
HGB UR QL STRIP.AUTO: NEGATIVE
HOLD SPECIMEN: NORMAL
HOROWITZ INDEX BLD+IHG-RTO: 100 %
HOROWITZ INDEX BLD+IHG-RTO: 28 %
HOROWITZ INDEX BLD+IHG-RTO: 30 %
HOROWITZ INDEX BLD+IHG-RTO: 32 %
HOROWITZ INDEX BLD+IHG-RTO: 40 %
HOROWITZ INDEX BLD+IHG-RTO: 40 %
HOROWITZ INDEX BLD+IHG-RTO: 50 %
HYALINE CASTS UR QL AUTO: ABNORMAL /LPF
HYALINE CASTS UR QL AUTO: ABNORMAL /LPF
HYALINE CASTS UR QL AUTO: NORMAL /LPF
IMM GRANULOCYTES # BLD: 0 10*3/MM3 (ref 0–0.03)
IMM GRANULOCYTES # BLD: 0.02 10*3/MM3 (ref 0–0.03)
IMM GRANULOCYTES # BLD: 0.02 10*3/MM3 (ref 0–0.03)
IMM GRANULOCYTES # BLD: 0.04 10*3/MM3 (ref 0–0.03)
IMM GRANULOCYTES # BLD: 0.11 10*3/MM3 (ref 0–0.03)
IMM GRANULOCYTES # BLD: 0.24 10*3/MM3 (ref 0–0.03)
IMM GRANULOCYTES NFR BLD: 0 % (ref 0–0.6)
IMM GRANULOCYTES NFR BLD: 0.2 % (ref 0–0.5)
IMM GRANULOCYTES NFR BLD: 0.2 % (ref 0–0.5)
IMM GRANULOCYTES NFR BLD: 0.3 % (ref 0–0.5)
IMM GRANULOCYTES NFR BLD: 0.3 % (ref 0–0.5)
IMM GRANULOCYTES NFR BLD: 0.4 % (ref 0–0.5)
IMM GRANULOCYTES NFR BLD: 0.4 % (ref 0–0.5)
IMM GRANULOCYTES NFR BLD: 0.9 % (ref 0–0.5)
IMM GRANULOCYTES NFR BLD: 1.9 % (ref 0–0.5)
INR PPP: 0.95 (ref 0.9–1.1)
INR PPP: 0.99 (ref 0.91–1.09)
INR PPP: 1.05 (ref 0.9–1.1)
KETONES UR QL STRIP: ABNORMAL
KETONES UR QL STRIP: NEGATIVE
KETONES UR QL STRIP: NEGATIVE
KOH PREP NAIL: NORMAL
L PNEUMO1 AG UR QL IA: NEGATIVE
LAB AP CASE REPORT: NORMAL
LAB AP CLINICAL INFORMATION: NORMAL
LDH FLD-CCNC: 252 U/L
LDH SERPL-CCNC: 237 U/L (ref 120–246)
LEUKOCYTE ESTERASE UR QL STRIP.AUTO: NEGATIVE
LYMPHOCYTES # BLD AUTO: 0.19 10*3/MM3 (ref 1–3)
LYMPHOCYTES # BLD AUTO: 0.2 10*3/MM3 (ref 1–3)
LYMPHOCYTES # BLD AUTO: 0.25 10*3/MM3 (ref 1–3)
LYMPHOCYTES # BLD AUTO: 0.28 10*3/MM3 (ref 1–3)
LYMPHOCYTES # BLD AUTO: 0.3 10*3/MM3 (ref 1–3)
LYMPHOCYTES # BLD AUTO: 0.34 10*3/MM3 (ref 0.6–4.8)
LYMPHOCYTES # BLD AUTO: 0.36 10*3/MM3 (ref 1–3)
LYMPHOCYTES # BLD AUTO: 0.69 10*3/MM3 (ref 1–3)
LYMPHOCYTES # BLD AUTO: 0.7 10*3/MM3 (ref 1–3)
LYMPHOCYTES NFR BLD AUTO: 1.5 % (ref 16–46)
LYMPHOCYTES NFR BLD AUTO: 2 % (ref 16–46)
LYMPHOCYTES NFR BLD AUTO: 2.6 % (ref 16–46)
LYMPHOCYTES NFR BLD AUTO: 2.8 % (ref 16–46)
LYMPHOCYTES NFR BLD AUTO: 2.8 % (ref 16–46)
LYMPHOCYTES NFR BLD AUTO: 3 % (ref 16–46)
LYMPHOCYTES NFR BLD AUTO: 5.6 % (ref 16–46)
LYMPHOCYTES NFR BLD AUTO: 5.7 % (ref 24–44)
LYMPHOCYTES NFR BLD AUTO: 5.9 % (ref 16–46)
LYMPHOCYTES NFR FLD MANUAL: 77 %
Lab: NORMAL
M PNEUMO IGM SER QL: NEGATIVE
MACROPHAGE FLUID: 15 %
MAGNESIUM SERPL-MCNC: 1.7 MG/DL (ref 1.7–2.6)
MAGNESIUM SERPL-MCNC: 1.8 MG/DL (ref 1.7–2.6)
MAGNESIUM SERPL-MCNC: 1.9 MG/DL (ref 1.7–2.6)
MAGNESIUM SERPL-MCNC: 1.9 MG/DL (ref 1.7–2.6)
MAGNESIUM SERPL-MCNC: 2 MG/DL (ref 1.7–2.6)
MAGNESIUM SERPL-MCNC: 2.2 MG/DL (ref 1.3–2.7)
MAXIMAL PREDICTED HEART RATE: 149 BPM
MCH RBC QN AUTO: 30.2 PG (ref 27–33)
MCH RBC QN AUTO: 30.6 PG (ref 27–33)
MCH RBC QN AUTO: 30.7 PG (ref 27–31)
MCH RBC QN AUTO: 30.8 PG (ref 27–33)
MCH RBC QN AUTO: 31.3 PG (ref 27–31)
MCH RBC QN AUTO: 31.3 PG (ref 27–33)
MCH RBC QN AUTO: 31.3 PG (ref 27–33)
MCH RBC QN AUTO: 31.7 PG (ref 27–31)
MCH RBC QN AUTO: 31.8 PG (ref 27–33)
MCHC RBC AUTO-ENTMCNC: 30 G/DL (ref 33–37)
MCHC RBC AUTO-ENTMCNC: 30.3 G/DL (ref 32–36)
MCHC RBC AUTO-ENTMCNC: 30.4 G/DL (ref 32–36)
MCHC RBC AUTO-ENTMCNC: 30.8 G/DL (ref 33–37)
MCHC RBC AUTO-ENTMCNC: 31.3 G/DL (ref 33–37)
MCHC RBC AUTO-ENTMCNC: 31.8 G/DL (ref 33–37)
MCHC RBC AUTO-ENTMCNC: 31.8 G/DL (ref 33–37)
MCHC RBC AUTO-ENTMCNC: 31.9 G/DL (ref 32–36)
MCHC RBC AUTO-ENTMCNC: 32 G/DL (ref 33–37)
MCHC RBC AUTO-ENTMCNC: 32.1 G/DL (ref 33–37)
MCHC RBC AUTO-ENTMCNC: 32.3 G/DL (ref 33–37)
MCV RBC AUTO: 100 FL (ref 80–94)
MCV RBC AUTO: 101 FL (ref 80–99)
MCV RBC AUTO: 103 FL (ref 80–99)
MCV RBC AUTO: 104.7 FL (ref 80–94)
MCV RBC AUTO: 94.7 FL (ref 80–94)
MCV RBC AUTO: 96 FL (ref 80–94)
MCV RBC AUTO: 96.1 FL (ref 80–94)
MCV RBC AUTO: 96.9 FL (ref 80–94)
MCV RBC AUTO: 98.4 FL (ref 80–94)
MCV RBC AUTO: 99.2 FL (ref 80–94)
MCV RBC AUTO: 99.5 FL (ref 80–99)
METHGB BLD QL: 0 % (ref 0–3)
METHGB BLD QL: 0.1 % (ref 0–3)
METHGB BLD QL: 0.2 % (ref 0–3)
METHGB BLD QL: 0.3 % (ref 0–3)
METHGB BLD QL: 0.4 % (ref 0–3)
MODALITY: ABNORMAL
MODALITY: NORMAL
MODALITY: NORMAL
MONOCYTES # BLD AUTO: 0.1 10*3/MM3 (ref 0–1)
MONOCYTES # BLD AUTO: 0.27 10*3/MM3 (ref 0.1–0.9)
MONOCYTES # BLD AUTO: 0.29 10*3/MM3 (ref 0.1–0.9)
MONOCYTES # BLD AUTO: 0.46 10*3/MM3 (ref 0.1–0.9)
MONOCYTES # BLD AUTO: 0.5 10*3/MM3 (ref 0.1–0.9)
MONOCYTES # BLD AUTO: 0.53 10*3/MM3 (ref 0.1–0.9)
MONOCYTES # BLD AUTO: 0.73 10*3/MM3 (ref 0.1–0.9)
MONOCYTES # BLD AUTO: 1.02 10*3/MM3 (ref 0.1–0.9)
MONOCYTES # BLD AUTO: 1.35 10*3/MM3 (ref 0.1–0.9)
MONOCYTES NFR BLD AUTO: 1.7 % (ref 0–12)
MONOCYTES NFR BLD AUTO: 10.7 % (ref 0–12)
MONOCYTES NFR BLD AUTO: 2.7 % (ref 0–12)
MONOCYTES NFR BLD AUTO: 3 % (ref 0–12)
MONOCYTES NFR BLD AUTO: 4.2 % (ref 0–12)
MONOCYTES NFR BLD AUTO: 4.2 % (ref 0–12)
MONOCYTES NFR BLD AUTO: 5.4 % (ref 0–12)
MONOCYTES NFR BLD AUTO: 6.2 % (ref 0–12)
MONOCYTES NFR BLD AUTO: 8 % (ref 0–12)
MYOGLOBIN SERPL-MCNC: 50 NG/ML (ref 0–109)
MYOGLOBIN SERPL-MCNC: 57 NG/ML (ref 0–109)
MYOGLOBIN SERPL-MCNC: 60 NG/ML (ref 0–109)
NEUTROPHILS # BLD AUTO: 10.01 10*3/MM3 (ref 1.4–6.5)
NEUTROPHILS # BLD AUTO: 10.01 10*3/MM3 (ref 1.4–6.5)
NEUTROPHILS # BLD AUTO: 10.11 10*3/MM3 (ref 1.4–6.5)
NEUTROPHILS # BLD AUTO: 10.89 10*3/MM3 (ref 1.4–6.5)
NEUTROPHILS # BLD AUTO: 11.51 10*3/MM3 (ref 1.4–6.5)
NEUTROPHILS # BLD AUTO: 5.51 10*3/MM3 (ref 1.5–8.3)
NEUTROPHILS # BLD AUTO: 9.01 10*3/MM3 (ref 1.4–6.5)
NEUTROPHILS # BLD AUTO: 9.06 10*3/MM3 (ref 1.4–6.5)
NEUTROPHILS # BLD AUTO: 9.69 10*3/MM3 (ref 1.4–6.5)
NEUTROPHILS NFR BLD AUTO: 79.8 % (ref 40–75)
NEUTROPHILS NFR BLD AUTO: 86.2 % (ref 40–75)
NEUTROPHILS NFR BLD AUTO: 90.2 % (ref 40–75)
NEUTROPHILS NFR BLD AUTO: 91.5 % (ref 40–75)
NEUTROPHILS NFR BLD AUTO: 91.6 % (ref 40–75)
NEUTROPHILS NFR BLD AUTO: 92.1 % (ref 40–75)
NEUTROPHILS NFR BLD AUTO: 92.1 % (ref 41–71)
NEUTROPHILS NFR BLD AUTO: 94 % (ref 40–75)
NEUTROPHILS NFR BLD AUTO: 95.1 % (ref 40–75)
NEUTROPHILS NFR FLD MANUAL: 8 %
NITRITE UR QL STRIP: NEGATIVE
O+P SPEC MICRO: NORMAL
OSMOLALITY SERPL CALC.SUM OF ELEC: 268 MOSM/KG (ref 273–305)
OSMOLALITY SERPL CALC.SUM OF ELEC: 280.9 MOSM/KG (ref 273–305)
OSMOLALITY SERPL CALC.SUM OF ELEC: 282.2 MOSM/KG (ref 273–305)
OSMOLALITY SERPL CALC.SUM OF ELEC: 283.4 MOSM/KG (ref 273–305)
OSMOLALITY SERPL CALC.SUM OF ELEC: 284.4 MOSM/KG (ref 273–305)
OSMOLALITY SERPL CALC.SUM OF ELEC: 285.8 MOSM/KG (ref 273–305)
OSMOLALITY SERPL CALC.SUM OF ELEC: 286.9 MOSM/KG (ref 273–305)
OSMOLALITY SERPL CALC.SUM OF ELEC: 290.2 MOSM/KG (ref 273–305)
OVA + PARASITE RESULT 1: NORMAL
OXYHGB MFR BLDV: 74 % (ref 85–100)
OXYHGB MFR BLDV: 89.3 % (ref 85–100)
OXYHGB MFR BLDV: 90.6 % (ref 85–100)
OXYHGB MFR BLDV: 92.2 % (ref 85–100)
OXYHGB MFR BLDV: 93 % (ref 85–100)
OXYHGB MFR BLDV: 93.2 % (ref 85–100)
OXYHGB MFR BLDV: 94.2 % (ref 85–100)
OXYHGB MFR BLDV: 94.2 % (ref 85–100)
OXYHGB MFR BLDV: 94.3 % (ref 85–100)
OXYHGB MFR BLDV: 94.9 % (ref 85–100)
OXYHGB MFR BLDV: 96.9 % (ref 85–100)
OXYHGB MFR BLDV: 97.1 % (ref 85–100)
OXYHGB MFR BLDV: 97.7 % (ref 85–100)
PATH REPORT.FINAL DX SPEC: NORMAL
PCO2 BLDA: 127.7 MM HG (ref 35–45)
PCO2 BLDA: 38.4 MM HG (ref 35–45)
PCO2 BLDA: 39.6 MM HG (ref 35–45)
PCO2 BLDA: 39.7 MM HG (ref 35–45)
PCO2 BLDA: 39.8 MM HG (ref 35–45)
PCO2 BLDA: 40.1 MM HG (ref 35–45)
PCO2 BLDA: 41.4 MM HG (ref 35–45)
PCO2 BLDA: 41.4 MM HG (ref 35–45)
PCO2 BLDA: 52.1 MM HG (ref 35–45)
PCO2 BLDA: 52.4 MM HG (ref 35–45)
PCO2 BLDA: 71.2 MM HG (ref 35–45)
PCO2 BLDA: 81.4 MM HG (ref 35–45)
PCO2 BLDA: 98.5 MM HG (ref 35–45)
PCO2 BLDV: 40 MM HG
PCO2 BLDV: 70 MM HG
PEEP RESPIRATORY: 5 CM[H2O]
PH BLDA: 7.13 PH UNITS (ref 7.35–7.45)
PH BLDA: 7.21 PH UNITS (ref 7.35–7.45)
PH BLDA: 7.25 PH UNITS (ref 7.35–7.45)
PH BLDA: 7.28 PH UNITS (ref 7.35–7.45)
PH BLDA: 7.32 PH UNITS (ref 7.35–7.45)
PH BLDA: 7.33 PH UNITS (ref 7.35–7.45)
PH BLDA: 7.33 PH UNITS (ref 7.35–7.45)
PH BLDA: 7.36 PH UNITS (ref 7.35–7.45)
PH BLDA: 7.37 PH UNITS (ref 7.35–7.45)
PH BLDA: 7.4 PH UNITS (ref 7.35–7.45)
PH BLDA: 7.46 PH UNITS (ref 7.35–7.45)
PH BLDA: 7.52 PH UNITS (ref 7.35–7.45)
PH BLDA: 7.52 PH UNITS (ref 7.35–7.45)
PH BLDV: 7.16 PH UNITS
PH BLDV: 7.49 PH UNITS
PH FLD: 7.5 [PH]
PH UR STRIP.AUTO: 6 [PH] (ref 5–8)
PH UR STRIP.AUTO: 6 [PH] (ref 5–8)
PH UR STRIP.AUTO: 6.5 [PH] (ref 5–8)
PHOSPHATE SERPL-MCNC: 1.8 MG/DL (ref 2.7–4.5)
PHOSPHATE SERPL-MCNC: 2.8 MG/DL (ref 2.7–4.5)
PHOSPHATE SERPL-MCNC: 2.9 MG/DL (ref 2.7–4.5)
PHOSPHATE SERPL-MCNC: 3.1 MG/DL (ref 2.7–4.5)
PHOSPHATE SERPL-MCNC: 3.3 MG/DL (ref 2.7–4.5)
PHOSPHATE SERPL-MCNC: 3.3 MG/DL (ref 2.7–4.5)
PHOSPHATE SERPL-MCNC: 3.4 MG/DL (ref 2.7–4.5)
PHOSPHATE SERPL-MCNC: 3.6 MG/DL (ref 2.4–5.1)
PHOSPHATE SERPL-MCNC: 4.2 MG/DL (ref 2.4–5.1)
PLATELET # BLD AUTO: 183 10*3/MM3 (ref 130–400)
PLATELET # BLD AUTO: 196 10*3/MM3 (ref 130–400)
PLATELET # BLD AUTO: 198 10*3/MM3 (ref 130–400)
PLATELET # BLD AUTO: 199 10*3/MM3 (ref 130–400)
PLATELET # BLD AUTO: 221 10*3/MM3 (ref 130–400)
PLATELET # BLD AUTO: 229 10*3/MM3 (ref 130–400)
PLATELET # BLD AUTO: 240 10*3/MM3 (ref 130–400)
PLATELET # BLD AUTO: 246 10*3/MM3 (ref 130–400)
PLATELET # BLD AUTO: 298 10*3/MM3 (ref 150–450)
PLATELET # BLD AUTO: 324 10*3/MM3 (ref 150–450)
PLATELET # BLD AUTO: 334 10*3/MM3 (ref 150–450)
PMV BLD AUTO: 10.7 FL (ref 6–10)
PMV BLD AUTO: 10.8 FL (ref 6–12)
PMV BLD AUTO: 10.8 FL (ref 6–12)
PMV BLD AUTO: 11 FL (ref 6–10)
PMV BLD AUTO: 11.2 FL (ref 6–12)
PMV BLD AUTO: 11.3 FL (ref 6–10)
PMV BLD AUTO: 11.3 FL (ref 6–10)
PMV BLD AUTO: 11.4 FL (ref 6–10)
PMV BLD AUTO: 11.5 FL (ref 6–10)
PMV BLD AUTO: 11.6 FL (ref 6–10)
PMV BLD AUTO: 11.7 FL (ref 6–10)
PO2 BLDA: 100.7 MM HG (ref 80–100)
PO2 BLDA: 112.3 MM HG (ref 80–100)
PO2 BLDA: 419.8 MM HG (ref 80–100)
PO2 BLDA: 49.4 MM HG (ref 80–100)
PO2 BLDA: 63.7 MM HG (ref 80–100)
PO2 BLDA: 65.6 MM HG (ref 80–100)
PO2 BLDA: 66.6 MM HG (ref 80–100)
PO2 BLDA: 69.7 MM HG (ref 80–100)
PO2 BLDA: 71 MM HG (ref 80–100)
PO2 BLDA: 77.4 MM HG (ref 80–100)
PO2 BLDA: 85.7 MM HG (ref 80–100)
PO2 BLDA: 88.6 MM HG (ref 80–100)
PO2 BLDA: 96.9 MM HG (ref 80–100)
PO2 BLDV: 47.8 MM HG
PO2 BLDV: 68.4 MM HG
POTASSIUM BLD-SCNC: 3.1 MMOL/L (ref 3.5–5.3)
POTASSIUM BLD-SCNC: 3.7 MMOL/L (ref 3.5–5.3)
POTASSIUM BLD-SCNC: 3.8 MMOL/L (ref 3.5–5.3)
POTASSIUM BLD-SCNC: 4.1 MMOL/L (ref 3.5–5.3)
POTASSIUM BLD-SCNC: 4.1 MMOL/L (ref 3.5–5.5)
POTASSIUM BLD-SCNC: 4.3 MMOL/L (ref 3.5–5.5)
POTASSIUM BLD-SCNC: 4.4 MMOL/L (ref 3.5–5.5)
POTASSIUM BLD-SCNC: 4.5 MMOL/L (ref 3.5–5.3)
POTASSIUM BLD-SCNC: 4.7 MMOL/L (ref 3.5–5.3)
POTASSIUM BLD-SCNC: 4.9 MMOL/L (ref 3.5–5.3)
POTASSIUM BLD-SCNC: 5 MMOL/L (ref 3.5–5.3)
PROCALCITONIN SERPL-MCNC: 0.18 NG/ML
PROT FLD-MCNC: <2 G/DL
PROT SERPL-MCNC: 4.3 G/DL (ref 6–8)
PROT SERPL-MCNC: 4.7 G/DL (ref 6–8)
PROT SERPL-MCNC: 5 G/DL (ref 6–8)
PROT SERPL-MCNC: 5.3 G/DL (ref 6–8)
PROT SERPL-MCNC: 5.3 G/DL (ref 6–8)
PROT SERPL-MCNC: 5.6 G/DL (ref 5.7–8.2)
PROT SERPL-MCNC: 5.6 G/DL (ref 6–8)
PROT SERPL-MCNC: 5.8 G/DL (ref 6–8)
PROT SERPL-MCNC: 6 G/DL (ref 5.7–8.2)
PROT SERPL-MCNC: 7.8 G/DL (ref 6–8)
PROT UR QL STRIP: ABNORMAL
PROTHROMBIN TIME: 10.4 SECONDS (ref 9.6–11.5)
PROTHROMBIN TIME: 12.8 SECONDS (ref 11–15.4)
PROTHROMBIN TIME: 13.8 SECONDS (ref 11–15.4)
PSV: 10 CMH2O
PSV: 12 CMH2O
PSV: 8 CMH2O
RBC # BLD AUTO: 3.58 10*6/MM3 (ref 4.2–5.4)
RBC # BLD AUTO: 3.67 10*6/MM3 (ref 3.89–5.14)
RBC # BLD AUTO: 3.73 10*6/MM3 (ref 4.2–5.4)
RBC # BLD AUTO: 3.77 10*6/MM3 (ref 4.2–5.4)
RBC # BLD AUTO: 3.86 10*6/MM3 (ref 4.2–5.4)
RBC # BLD AUTO: 3.98 10*6/MM3 (ref 3.89–5.14)
RBC # BLD AUTO: 4.12 10*6/MM3 (ref 4.2–5.4)
RBC # BLD AUTO: 4.16 10*6/MM3 (ref 4.2–5.4)
RBC # BLD AUTO: 4.29 10*6/MM3 (ref 4.2–5.4)
RBC # BLD AUTO: 4.32 10*6/MM3 (ref 3.89–5.14)
RBC # BLD AUTO: 4.74 10*6/MM3 (ref 4.2–5.4)
RBC # FLD AUTO: 3000 /MM3
RBC # UR: ABNORMAL /HPF
RBC # UR: ABNORMAL /HPF
RBC # UR: NORMAL /HPF
REF LAB TEST METHOD: ABNORMAL
REF LAB TEST METHOD: ABNORMAL
REF LAB TEST METHOD: NORMAL
SAO2 % BLDCOA: 75.8 % (ref 90–100)
SAO2 % BLDCOA: 90.4 % (ref 90–100)
SAO2 % BLDCOA: 91.9 % (ref 90–100)
SAO2 % BLDCOA: 93.2 % (ref 90–100)
SAO2 % BLDCOA: 94.3 % (ref 90–100)
SAO2 % BLDCOA: 94.8 % (ref 90–100)
SAO2 % BLDCOA: 95.3 % (ref 90–100)
SAO2 % BLDCOA: 95.4 % (ref 90–100)
SAO2 % BLDCOA: 96.5 % (ref 90–100)
SAO2 % BLDCOA: 96.9 % (ref 90–100)
SAO2 % BLDCOA: 97.8 % (ref 90–100)
SAO2 % BLDCOA: 99.2 % (ref 90–100)
SAO2 % BLDCOA: 99.7 % (ref 90–100)
SAO2 % BLDCOV: 74.8 %
SAO2 % BLDCOV: 94.8 %
SET MECH RESP RATE: 16
SET MECH RESP RATE: 16
SET MECH RESP RATE: 18
SET MECH RESP RATE: 20
SET MECH RESP RATE: 22
SET MECH RESP RATE: 22
SET MECH RESP RATE: 24
SODIUM BLD-SCNC: 130 MMOL/L (ref 135–153)
SODIUM BLD-SCNC: 138 MMOL/L (ref 132–146)
SODIUM BLD-SCNC: 138 MMOL/L (ref 135–153)
SODIUM BLD-SCNC: 139 MMOL/L (ref 132–146)
SODIUM BLD-SCNC: 139 MMOL/L (ref 132–146)
SODIUM BLD-SCNC: 139 MMOL/L (ref 135–153)
SODIUM BLD-SCNC: 139 MMOL/L (ref 135–153)
SODIUM BLD-SCNC: 140 MMOL/L (ref 135–153)
SODIUM BLD-SCNC: 141 MMOL/L (ref 135–153)
SODIUM BLD-SCNC: 141 MMOL/L (ref 135–153)
SODIUM BLD-SCNC: 142 MMOL/L (ref 135–153)
SP GR UR STRIP: 1.02 (ref 1–1.03)
SP GR UR STRIP: 1.05 (ref 1–1.03)
SP GR UR STRIP: >1.03 (ref 1–1.03)
SQUAMOUS #/AREA URNS HPF: ABNORMAL /HPF
SQUAMOUS #/AREA URNS HPF: ABNORMAL /HPF
SQUAMOUS #/AREA URNS HPF: NORMAL /HPF
STRESS TARGET HR: 127 BPM
TOTAL RATE: 29 BREATHS/MINUTE
TRIGL FLD-MCNC: 15 MG/DL
TROPONIN I SERPL-MCNC: 0.01 NG/ML
TROPONIN I SERPL-MCNC: 0.02 NG/ML
TROPONIN I SERPL-MCNC: 0.02 NG/ML
TROPONIN I SERPL-MCNC: 0.03 NG/ML
TROPONIN I SERPL-MCNC: 0.04 NG/ML
TSH SERPL DL<=0.05 MIU/L-ACNC: 0.67 MIU/ML (ref 0.55–4.78)
TSH SERPL DL<=0.05 MIU/L-ACNC: 2.64 MIU/ML (ref 0.35–5.35)
UROBILINOGEN UR QL STRIP: ABNORMAL
VANCOMYCIN TROUGH SERPL-MCNC: 20.2 MCG/ML (ref 5–15)
VANCOMYCIN TROUGH SERPL-MCNC: 8.5 MCG/ML (ref 5–15)
VENTILATOR MODE: ABNORMAL
VENTILATOR MODE: AC
VENTILATOR MODE: NORMAL
VIT B12 BLD-MCNC: 455 PG/ML (ref 211–911)
VT ON VENT VENT: 300 ML
VT ON VENT VENT: 300 ML
VT ON VENT VENT: 350 ML
VT ON VENT VENT: 380 ML
WBC # FLD: 648 /MM3
WBC NRBC COR # BLD: 10.18 10*3/MM3 (ref 4.5–12.5)
WBC NRBC COR # BLD: 10.86 10*3/MM3 (ref 4.5–12.5)
WBC NRBC COR # BLD: 11.73 10*3/MM3 (ref 4.5–12.5)
WBC NRBC COR # BLD: 11.9 10*3/MM3 (ref 4.5–12.5)
WBC NRBC COR # BLD: 12.56 10*3/MM3 (ref 4.5–12.5)
WBC NRBC COR # BLD: 12.76 10*3/MM3 (ref 4.5–12.5)
WBC NRBC COR # BLD: 5.98 10*3/MM3 (ref 3.5–10.8)
WBC NRBC COR # BLD: 7.26 10*3/MM3 (ref 3.5–10.8)
WBC NRBC COR # BLD: 9.64 10*3/MM3 (ref 4.5–12.5)
WBC NRBC COR # BLD: 9.84 10*3/MM3 (ref 4.5–12.5)
WBC NRBC COR # BLD: 9.92 10*3/MM3 (ref 3.5–10.8)
WBC UR QL AUTO: ABNORMAL /HPF
WBC UR QL AUTO: ABNORMAL /HPF
WBC UR QL AUTO: NORMAL /HPF

## 2018-01-01 PROCEDURE — 99203 OFFICE O/P NEW LOW 30 MIN: CPT | Performed by: THORACIC SURGERY (CARDIOTHORACIC VASCULAR SURGERY)

## 2018-01-01 PROCEDURE — 87899 AGENT NOS ASSAY W/OPTIC: CPT | Performed by: PHYSICIAN ASSISTANT

## 2018-01-01 PROCEDURE — 86140 C-REACTIVE PROTEIN: CPT | Performed by: INTERNAL MEDICINE

## 2018-01-01 PROCEDURE — 87116 MYCOBACTERIA CULTURE: CPT | Performed by: PHYSICIAN ASSISTANT

## 2018-01-01 PROCEDURE — 0B9G8ZX DRAINAGE OF LEFT UPPER LUNG LOBE, VIA NATURAL OR ARTIFICIAL OPENING ENDOSCOPIC, DIAGNOSTIC: ICD-10-PCS | Performed by: INTERNAL MEDICINE

## 2018-01-01 PROCEDURE — 84100 ASSAY OF PHOSPHORUS: CPT | Performed by: PHYSICIAN ASSISTANT

## 2018-01-01 PROCEDURE — 87186 SC STD MICRODIL/AGAR DIL: CPT | Performed by: PHYSICIAN ASSISTANT

## 2018-01-01 PROCEDURE — 99291 CRITICAL CARE FIRST HOUR: CPT | Performed by: HOSPITALIST

## 2018-01-01 PROCEDURE — 83050 HGB METHEMOGLOBIN QUAN: CPT | Performed by: INTERNAL MEDICINE

## 2018-01-01 PROCEDURE — 94760 N-INVAS EAR/PLS OXIMETRY 1: CPT

## 2018-01-01 PROCEDURE — 71045 X-RAY EXAM CHEST 1 VIEW: CPT | Performed by: RADIOLOGY

## 2018-01-01 PROCEDURE — 86140 C-REACTIVE PROTEIN: CPT | Performed by: HOSPITALIST

## 2018-01-01 PROCEDURE — 80053 COMPREHEN METABOLIC PANEL: CPT | Performed by: INTERNAL MEDICINE

## 2018-01-01 PROCEDURE — 84100 ASSAY OF PHOSPHORUS: CPT | Performed by: INTERNAL MEDICINE

## 2018-01-01 PROCEDURE — 25010000002 PROPOFOL 1000 MG/ML EMULSION: Performed by: PHYSICIAN ASSISTANT

## 2018-01-01 PROCEDURE — 0BH17EZ INSERTION OF ENDOTRACHEAL AIRWAY INTO TRACHEA, VIA NATURAL OR ARTIFICIAL OPENING: ICD-10-PCS | Performed by: INTERNAL MEDICINE

## 2018-01-01 PROCEDURE — 99232 SBSQ HOSP IP/OBS MODERATE 35: CPT | Performed by: THORACIC SURGERY (CARDIOTHORACIC VASCULAR SURGERY)

## 2018-01-01 PROCEDURE — 84311 SPECTROPHOTOMETRY: CPT | Performed by: PHYSICIAN ASSISTANT

## 2018-01-01 PROCEDURE — 25010000002 METHYLPREDNISOLONE PER 40 MG: Performed by: PHYSICIAN ASSISTANT

## 2018-01-01 PROCEDURE — 25010000002 HEPARIN (PORCINE) PER 1000 UNITS: Performed by: INTERNAL MEDICINE

## 2018-01-01 PROCEDURE — 25010000002 PROPOFOL 10 MG/ML EMULSION

## 2018-01-01 PROCEDURE — 94799 UNLISTED PULMONARY SVC/PX: CPT

## 2018-01-01 PROCEDURE — 83605 ASSAY OF LACTIC ACID: CPT | Performed by: EMERGENCY MEDICINE

## 2018-01-01 PROCEDURE — 88305 TISSUE EXAM BY PATHOLOGIST: CPT | Performed by: INTERNAL MEDICINE

## 2018-01-01 PROCEDURE — 70470 CT HEAD/BRAIN W/O & W/DYE: CPT

## 2018-01-01 PROCEDURE — 25010000002 VANCOMYCIN PER 500 MG

## 2018-01-01 PROCEDURE — 87209 SMEAR COMPLEX STAIN: CPT | Performed by: INTERNAL MEDICINE

## 2018-01-01 PROCEDURE — 25010000002 SULFUR HEXAFLUORIDE MICROSPH 60.7-25 MG RECONSTITUTED SUSPENSION: Performed by: HOSPITALIST

## 2018-01-01 PROCEDURE — 82550 ASSAY OF CK (CPK): CPT | Performed by: EMERGENCY MEDICINE

## 2018-01-01 PROCEDURE — 36600 WITHDRAWAL OF ARTERIAL BLOOD: CPT | Performed by: INTERNAL MEDICINE

## 2018-01-01 PROCEDURE — 94003 VENT MGMT INPAT SUBQ DAY: CPT

## 2018-01-01 PROCEDURE — 83735 ASSAY OF MAGNESIUM: CPT | Performed by: INTERNAL MEDICINE

## 2018-01-01 PROCEDURE — 5A1955Z RESPIRATORY VENTILATION, GREATER THAN 96 CONSECUTIVE HOURS: ICD-10-PCS | Performed by: INTERNAL MEDICINE

## 2018-01-01 PROCEDURE — 25010000002 HEPARIN (PORCINE) PER 1000 UNITS: Performed by: PHYSICIAN ASSISTANT

## 2018-01-01 PROCEDURE — 0B9J8ZX DRAINAGE OF LEFT LOWER LUNG LOBE, VIA NATURAL OR ARTIFICIAL OPENING ENDOSCOPIC, DIAGNOSTIC: ICD-10-PCS | Performed by: INTERNAL MEDICINE

## 2018-01-01 PROCEDURE — 74178 CT ABD&PLV WO CNTR FLWD CNTR: CPT

## 2018-01-01 PROCEDURE — 0W9B3ZZ DRAINAGE OF LEFT PLEURAL CAVITY, PERCUTANEOUS APPROACH: ICD-10-PCS | Performed by: RADIOLOGY

## 2018-01-01 PROCEDURE — 71045 X-RAY EXAM CHEST 1 VIEW: CPT

## 2018-01-01 PROCEDURE — 82375 ASSAY CARBOXYHB QUANT: CPT | Performed by: INTERNAL MEDICINE

## 2018-01-01 PROCEDURE — 83880 ASSAY OF NATRIURETIC PEPTIDE: CPT | Performed by: PHYSICIAN ASSISTANT

## 2018-01-01 PROCEDURE — 25010000002 LORAZEPAM PER 2 MG

## 2018-01-01 PROCEDURE — 83874 ASSAY OF MYOGLOBIN: CPT | Performed by: HOSPITALIST

## 2018-01-01 PROCEDURE — 84484 ASSAY OF TROPONIN QUANT: CPT | Performed by: HOSPITALIST

## 2018-01-01 PROCEDURE — 97110 THERAPEUTIC EXERCISES: CPT

## 2018-01-01 PROCEDURE — 25010000002 LEVOFLOXACIN PER 250 MG: Performed by: INTERNAL MEDICINE

## 2018-01-01 PROCEDURE — 25010000002 PIPERACILLIN-TAZOBACTAM: Performed by: INTERNAL MEDICINE

## 2018-01-01 PROCEDURE — 99231 SBSQ HOSP IP/OBS SF/LOW 25: CPT | Performed by: PHYSICIAN ASSISTANT

## 2018-01-01 PROCEDURE — 25010000002 MAGNESIUM SULFATE IN D5W 1G/100ML (PREMIX) 1-5 GM/100ML-% SOLUTION: Performed by: INTERNAL MEDICINE

## 2018-01-01 PROCEDURE — 94640 AIRWAY INHALATION TREATMENT: CPT

## 2018-01-01 PROCEDURE — 82746 ASSAY OF FOLIC ACID SERUM: CPT | Performed by: PHYSICIAN ASSISTANT

## 2018-01-01 PROCEDURE — 81001 URINALYSIS AUTO W/SCOPE: CPT | Performed by: PHYSICIAN ASSISTANT

## 2018-01-01 PROCEDURE — 84484 ASSAY OF TROPONIN QUANT: CPT | Performed by: EMERGENCY MEDICINE

## 2018-01-01 PROCEDURE — 80202 ASSAY OF VANCOMYCIN: CPT | Performed by: INTERNAL MEDICINE

## 2018-01-01 PROCEDURE — 25010000002 MORPHINE PER 10 MG: Performed by: INTERNAL MEDICINE

## 2018-01-01 PROCEDURE — 87102 FUNGUS ISOLATION CULTURE: CPT | Performed by: INTERNAL MEDICINE

## 2018-01-01 PROCEDURE — 89051 BODY FLUID CELL COUNT: CPT | Performed by: PHYSICIAN ASSISTANT

## 2018-01-01 PROCEDURE — 88173 CYTOPATH EVAL FNA REPORT: CPT | Performed by: INTERNAL MEDICINE

## 2018-01-01 PROCEDURE — 85027 COMPLETE CBC AUTOMATED: CPT | Performed by: PHYSICIAN ASSISTANT

## 2018-01-01 PROCEDURE — 25010000002 VANCOMYCIN PER 500 MG: Performed by: INTERNAL MEDICINE

## 2018-01-01 PROCEDURE — 25010000002 MORPHINE PER 10 MG: Performed by: NURSE PRACTITIONER

## 2018-01-01 PROCEDURE — 63710000001 INSULIN ASPART PER 5 UNITS: Performed by: PHYSICIAN ASSISTANT

## 2018-01-01 PROCEDURE — 25010000002 HYDRALAZINE PER 20 MG: Performed by: PHYSICIAN ASSISTANT

## 2018-01-01 PROCEDURE — 80053 COMPREHEN METABOLIC PANEL: CPT | Performed by: PHYSICIAN ASSISTANT

## 2018-01-01 PROCEDURE — 87077 CULTURE AEROBIC IDENTIFY: CPT | Performed by: INTERNAL MEDICINE

## 2018-01-01 PROCEDURE — 85730 THROMBOPLASTIN TIME PARTIAL: CPT | Performed by: EMERGENCY MEDICINE

## 2018-01-01 PROCEDURE — 81001 URINALYSIS AUTO W/SCOPE: CPT | Performed by: HOSPITALIST

## 2018-01-01 PROCEDURE — 0DJ08ZZ INSPECTION OF UPPER INTESTINAL TRACT, VIA NATURAL OR ARTIFICIAL OPENING ENDOSCOPIC: ICD-10-PCS | Performed by: INTERNAL MEDICINE

## 2018-01-01 PROCEDURE — G8978 MOBILITY CURRENT STATUS: HCPCS

## 2018-01-01 PROCEDURE — 99291 CRITICAL CARE FIRST HOUR: CPT | Performed by: INTERNAL MEDICINE

## 2018-01-01 PROCEDURE — 25010000002 VANCOMYCIN PER 500 MG: Performed by: EMERGENCY MEDICINE

## 2018-01-01 PROCEDURE — G8979 MOBILITY GOAL STATUS: HCPCS

## 2018-01-01 PROCEDURE — 85025 COMPLETE CBC W/AUTO DIFF WBC: CPT | Performed by: EMERGENCY MEDICINE

## 2018-01-01 PROCEDURE — 87075 CULTR BACTERIA EXCEPT BLOOD: CPT | Performed by: PHYSICIAN ASSISTANT

## 2018-01-01 PROCEDURE — 36600 WITHDRAWAL OF ARTERIAL BLOOD: CPT | Performed by: EMERGENCY MEDICINE

## 2018-01-01 PROCEDURE — 87493 C DIFF AMPLIFIED PROBE: CPT | Performed by: INTERNAL MEDICINE

## 2018-01-01 PROCEDURE — 82805 BLOOD GASES W/O2 SATURATION: CPT | Performed by: INTERNAL MEDICINE

## 2018-01-01 PROCEDURE — 96365 THER/PROPH/DIAG IV INF INIT: CPT

## 2018-01-01 PROCEDURE — 85025 COMPLETE CBC W/AUTO DIFF WBC: CPT | Performed by: INTERNAL MEDICINE

## 2018-01-01 PROCEDURE — C1729 CATH, DRAINAGE: HCPCS

## 2018-01-01 PROCEDURE — C1751 CATH, INF, PER/CENT/MIDLINE: HCPCS

## 2018-01-01 PROCEDURE — 93005 ELECTROCARDIOGRAM TRACING: CPT

## 2018-01-01 PROCEDURE — 82272 OCCULT BLD FECES 1-3 TESTS: CPT | Performed by: INTERNAL MEDICINE

## 2018-01-01 PROCEDURE — 87040 BLOOD CULTURE FOR BACTERIA: CPT | Performed by: EMERGENCY MEDICINE

## 2018-01-01 PROCEDURE — 43235 EGD DIAGNOSTIC BRUSH WASH: CPT | Performed by: INTERNAL MEDICINE

## 2018-01-01 PROCEDURE — 87205 SMEAR GRAM STAIN: CPT | Performed by: PHYSICIAN ASSISTANT

## 2018-01-01 PROCEDURE — 84443 ASSAY THYROID STIM HORMONE: CPT | Performed by: PHYSICIAN ASSISTANT

## 2018-01-01 PROCEDURE — 82805 BLOOD GASES W/O2 SATURATION: CPT | Performed by: EMERGENCY MEDICINE

## 2018-01-01 PROCEDURE — 93010 ELECTROCARDIOGRAM REPORT: CPT | Performed by: INTERNAL MEDICINE

## 2018-01-01 PROCEDURE — 82248 BILIRUBIN DIRECT: CPT | Performed by: INTERNAL MEDICINE

## 2018-01-01 PROCEDURE — 87070 CULTURE OTHR SPECIMN AEROBIC: CPT | Performed by: PHYSICIAN ASSISTANT

## 2018-01-01 PROCEDURE — 85379 FIBRIN DEGRADATION QUANT: CPT | Performed by: EMERGENCY MEDICINE

## 2018-01-01 PROCEDURE — 83986 ASSAY PH BODY FLUID NOS: CPT | Performed by: PHYSICIAN ASSISTANT

## 2018-01-01 PROCEDURE — 85025 COMPLETE CBC W/AUTO DIFF WBC: CPT | Performed by: PHYSICIAN ASSISTANT

## 2018-01-01 PROCEDURE — 99222 1ST HOSP IP/OBS MODERATE 55: CPT | Performed by: INTERNAL MEDICINE

## 2018-01-01 PROCEDURE — 81001 URINALYSIS AUTO W/SCOPE: CPT | Performed by: EMERGENCY MEDICINE

## 2018-01-01 PROCEDURE — 25010000002 MIDAZOLAM PER 1 MG: Performed by: PHYSICIAN ASSISTANT

## 2018-01-01 PROCEDURE — 25010000002 METHYLPREDNISOLONE PER 40 MG: Performed by: EMERGENCY MEDICINE

## 2018-01-01 PROCEDURE — 82962 GLUCOSE BLOOD TEST: CPT | Performed by: FAMILY MEDICINE

## 2018-01-01 PROCEDURE — 71275 CT ANGIOGRAPHY CHEST: CPT | Performed by: RADIOLOGY

## 2018-01-01 PROCEDURE — 82962 GLUCOSE BLOOD TEST: CPT

## 2018-01-01 PROCEDURE — 83605 ASSAY OF LACTIC ACID: CPT | Performed by: PHYSICIAN ASSISTANT

## 2018-01-01 PROCEDURE — 99239 HOSP IP/OBS DSCHRG MGMT >30: CPT | Performed by: HOSPITALIST

## 2018-01-01 PROCEDURE — 87186 SC STD MICRODIL/AGAR DIL: CPT | Performed by: INTERNAL MEDICINE

## 2018-01-01 PROCEDURE — 82375 ASSAY CARBOXYHB QUANT: CPT | Performed by: EMERGENCY MEDICINE

## 2018-01-01 PROCEDURE — 0 IOPAMIDOL PER 1 ML: Performed by: HOSPITALIST

## 2018-01-01 PROCEDURE — 93005 ELECTROCARDIOGRAM TRACING: CPT | Performed by: PHYSICIAN ASSISTANT

## 2018-01-01 PROCEDURE — 63710000001 INSULIN LISPRO (HUMAN) PER 5 UNITS: Performed by: PHYSICIAN ASSISTANT

## 2018-01-01 PROCEDURE — 87804 INFLUENZA ASSAY W/OPTIC: CPT | Performed by: EMERGENCY MEDICINE

## 2018-01-01 PROCEDURE — 99231 SBSQ HOSP IP/OBS SF/LOW 25: CPT | Performed by: INTERNAL MEDICINE

## 2018-01-01 PROCEDURE — 83880 ASSAY OF NATRIURETIC PEPTIDE: CPT | Performed by: EMERGENCY MEDICINE

## 2018-01-01 PROCEDURE — 83050 HGB METHEMOGLOBIN QUAN: CPT | Performed by: EMERGENCY MEDICINE

## 2018-01-01 PROCEDURE — 93005 ELECTROCARDIOGRAM TRACING: CPT | Performed by: EMERGENCY MEDICINE

## 2018-01-01 PROCEDURE — 82042 OTHER SOURCE ALBUMIN QUAN EA: CPT | Performed by: PHYSICIAN ASSISTANT

## 2018-01-01 PROCEDURE — 83735 ASSAY OF MAGNESIUM: CPT | Performed by: HOSPITALIST

## 2018-01-01 PROCEDURE — 80053 COMPREHEN METABOLIC PANEL: CPT | Performed by: EMERGENCY MEDICINE

## 2018-01-01 PROCEDURE — 87086 URINE CULTURE/COLONY COUNT: CPT | Performed by: HOSPITALIST

## 2018-01-01 PROCEDURE — 85730 THROMBOPLASTIN TIME PARTIAL: CPT | Performed by: PHYSICIAN ASSISTANT

## 2018-01-01 PROCEDURE — 70450 CT HEAD/BRAIN W/O DYE: CPT | Performed by: RADIOLOGY

## 2018-01-01 PROCEDURE — 88112 CYTOPATH CELL ENHANCE TECH: CPT | Performed by: PHYSICIAN ASSISTANT

## 2018-01-01 PROCEDURE — 83615 LACTATE (LD) (LDH) ENZYME: CPT | Performed by: HOSPITALIST

## 2018-01-01 PROCEDURE — 87045 FECES CULTURE AEROBIC BACT: CPT | Performed by: INTERNAL MEDICINE

## 2018-01-01 PROCEDURE — 71275 CT ANGIOGRAPHY CHEST: CPT

## 2018-01-01 PROCEDURE — 25010000002 METHYLPREDNISOLONE PER 125 MG: Performed by: HOSPITALIST

## 2018-01-01 PROCEDURE — 80069 RENAL FUNCTION PANEL: CPT | Performed by: HOSPITALIST

## 2018-01-01 PROCEDURE — 63710000001 PREDNISONE PER 1 MG: Performed by: HOSPITALIST

## 2018-01-01 PROCEDURE — 70450 CT HEAD/BRAIN W/O DYE: CPT

## 2018-01-01 PROCEDURE — 31623 DX BRONCHOSCOPE/BRUSH: CPT | Performed by: INTERNAL MEDICINE

## 2018-01-01 PROCEDURE — 93306 TTE W/DOPPLER COMPLETE: CPT

## 2018-01-01 PROCEDURE — 87015 SPECIMEN INFECT AGNT CONCNTJ: CPT | Performed by: PHYSICIAN ASSISTANT

## 2018-01-01 PROCEDURE — 31500 INSERT EMERGENCY AIRWAY: CPT | Performed by: EMERGENCY MEDICINE

## 2018-01-01 PROCEDURE — 0BDJ8ZX EXTRACTION OF LEFT LOWER LUNG LOBE, VIA NATURAL OR ARTIFICIAL OPENING ENDOSCOPIC, DIAGNOSTIC: ICD-10-PCS | Performed by: INTERNAL MEDICINE

## 2018-01-01 PROCEDURE — 99223 1ST HOSP IP/OBS HIGH 75: CPT | Performed by: HOSPITALIST

## 2018-01-01 PROCEDURE — 93005 ELECTROCARDIOGRAM TRACING: CPT | Performed by: INTERNAL MEDICINE

## 2018-01-01 PROCEDURE — 85610 PROTHROMBIN TIME: CPT | Performed by: NURSE PRACTITIONER

## 2018-01-01 PROCEDURE — 82550 ASSAY OF CK (CPK): CPT | Performed by: HOSPITALIST

## 2018-01-01 PROCEDURE — 75989 ABSCESS DRAINAGE UNDER X-RAY: CPT

## 2018-01-01 PROCEDURE — 36415 COLL VENOUS BLD VENIPUNCTURE: CPT

## 2018-01-01 PROCEDURE — 84145 PROCALCITONIN (PCT): CPT | Performed by: PHYSICIAN ASSISTANT

## 2018-01-01 PROCEDURE — 0B9F8ZX DRAINAGE OF RIGHT LOWER LUNG LOBE, VIA NATURAL OR ARTIFICIAL OPENING ENDOSCOPIC, DIAGNOSTIC: ICD-10-PCS | Performed by: INTERNAL MEDICINE

## 2018-01-01 PROCEDURE — 87205 SMEAR GRAM STAIN: CPT | Performed by: INTERNAL MEDICINE

## 2018-01-01 PROCEDURE — 25010000002 LORAZEPAM PER 2 MG: Performed by: NURSE PRACTITIONER

## 2018-01-01 PROCEDURE — 84478 ASSAY OF TRIGLYCERIDES: CPT | Performed by: PHYSICIAN ASSISTANT

## 2018-01-01 PROCEDURE — 86140 C-REACTIVE PROTEIN: CPT | Performed by: EMERGENCY MEDICINE

## 2018-01-01 PROCEDURE — 99222 1ST HOSP IP/OBS MODERATE 55: CPT | Performed by: PHYSICIAN ASSISTANT

## 2018-01-01 PROCEDURE — 82945 GLUCOSE OTHER FLUID: CPT | Performed by: PHYSICIAN ASSISTANT

## 2018-01-01 PROCEDURE — 25010000002 CEFTRIAXONE: Performed by: EMERGENCY MEDICINE

## 2018-01-01 PROCEDURE — 87046 STOOL CULTR AEROBIC BACT EA: CPT | Performed by: INTERNAL MEDICINE

## 2018-01-01 PROCEDURE — 82553 CREATINE MB FRACTION: CPT | Performed by: HOSPITALIST

## 2018-01-01 PROCEDURE — 96368 THER/DIAG CONCURRENT INF: CPT

## 2018-01-01 PROCEDURE — 83615 LACTATE (LD) (LDH) ENZYME: CPT | Performed by: PHYSICIAN ASSISTANT

## 2018-01-01 PROCEDURE — 84484 ASSAY OF TROPONIN QUANT: CPT | Performed by: PHYSICIAN ASSISTANT

## 2018-01-01 PROCEDURE — 25010000002 METHYLPREDNISOLONE PER 40 MG: Performed by: INTERNAL MEDICINE

## 2018-01-01 PROCEDURE — 99284 EMERGENCY DEPT VISIT MOD MDM: CPT

## 2018-01-01 PROCEDURE — 25010000002 PIPERACILLIN-TAZOBACTAM: Performed by: EMERGENCY MEDICINE

## 2018-01-01 PROCEDURE — 86738 MYCOPLASMA ANTIBODY: CPT | Performed by: HOSPITALIST

## 2018-01-01 PROCEDURE — 83880 ASSAY OF NATRIURETIC PEPTIDE: CPT | Performed by: INTERNAL MEDICINE

## 2018-01-01 PROCEDURE — 31500 INSERT EMERGENCY AIRWAY: CPT | Performed by: INTERNAL MEDICINE

## 2018-01-01 PROCEDURE — 87077 CULTURE AEROBIC IDENTIFY: CPT | Performed by: PHYSICIAN ASSISTANT

## 2018-01-01 PROCEDURE — 87116 MYCOBACTERIA CULTURE: CPT | Performed by: INTERNAL MEDICINE

## 2018-01-01 PROCEDURE — 82607 VITAMIN B-12: CPT | Performed by: HOSPITALIST

## 2018-01-01 PROCEDURE — 87206 SMEAR FLUORESCENT/ACID STAI: CPT | Performed by: INTERNAL MEDICINE

## 2018-01-01 PROCEDURE — 87206 SMEAR FLUORESCENT/ACID STAI: CPT | Performed by: PHYSICIAN ASSISTANT

## 2018-01-01 PROCEDURE — 97162 PT EVAL MOD COMPLEX 30 MIN: CPT

## 2018-01-01 PROCEDURE — 0BH17EZ INSERTION OF ENDOTRACHEAL AIRWAY INTO TRACHEA, VIA NATURAL OR ARTIFICIAL OPENING: ICD-10-PCS | Performed by: EMERGENCY MEDICINE

## 2018-01-01 PROCEDURE — 25010000002 LORAZEPAM PER 2 MG: Performed by: INTERNAL MEDICINE

## 2018-01-01 PROCEDURE — 84157 ASSAY OF PROTEIN OTHER: CPT | Performed by: PHYSICIAN ASSISTANT

## 2018-01-01 PROCEDURE — 87070 CULTURE OTHR SPECIMN AEROBIC: CPT | Performed by: INTERNAL MEDICINE

## 2018-01-01 PROCEDURE — 83036 HEMOGLOBIN GLYCOSYLATED A1C: CPT | Performed by: PHYSICIAN ASSISTANT

## 2018-01-01 PROCEDURE — 94002 VENT MGMT INPAT INIT DAY: CPT

## 2018-01-01 PROCEDURE — 83735 ASSAY OF MAGNESIUM: CPT | Performed by: PHYSICIAN ASSISTANT

## 2018-01-01 PROCEDURE — 99233 SBSQ HOSP IP/OBS HIGH 50: CPT | Performed by: HOSPITALIST

## 2018-01-01 PROCEDURE — 31624 DX BRONCHOSCOPE/LAVAGE: CPT | Performed by: INTERNAL MEDICINE

## 2018-01-01 PROCEDURE — 99285 EMERGENCY DEPT VISIT HI MDM: CPT

## 2018-01-01 PROCEDURE — 87177 OVA AND PARASITES SMEARS: CPT | Performed by: INTERNAL MEDICINE

## 2018-01-01 PROCEDURE — 25010000002 METHYLPREDNISOLONE PER 125 MG: Performed by: EMERGENCY MEDICINE

## 2018-01-01 PROCEDURE — 0 IOPAMIDOL PER 1 ML: Performed by: EMERGENCY MEDICINE

## 2018-01-01 PROCEDURE — 93306 TTE W/DOPPLER COMPLETE: CPT | Performed by: INTERNAL MEDICINE

## 2018-01-01 PROCEDURE — 82330 ASSAY OF CALCIUM: CPT | Performed by: PHYSICIAN ASSISTANT

## 2018-01-01 PROCEDURE — P9612 CATHETERIZE FOR URINE SPEC: HCPCS

## 2018-01-01 PROCEDURE — 88112 CYTOPATH CELL ENHANCE TECH: CPT | Performed by: INTERNAL MEDICINE

## 2018-01-01 PROCEDURE — 96375 TX/PRO/DX INJ NEW DRUG ADDON: CPT

## 2018-01-01 PROCEDURE — 25010000003 POTASSIUM CHLORIDE 10 MEQ/100ML SOLUTION: Performed by: EMERGENCY MEDICINE

## 2018-01-01 PROCEDURE — 85027 COMPLETE CBC AUTOMATED: CPT | Performed by: HOSPITALIST

## 2018-01-01 PROCEDURE — 82805 BLOOD GASES W/O2 SATURATION: CPT | Performed by: NURSE PRACTITIONER

## 2018-01-01 PROCEDURE — 87220 TISSUE EXAM FOR FUNGI: CPT | Performed by: PHYSICIAN ASSISTANT

## 2018-01-01 PROCEDURE — 85610 PROTHROMBIN TIME: CPT | Performed by: EMERGENCY MEDICINE

## 2018-01-01 PROCEDURE — 87102 FUNGUS ISOLATION CULTURE: CPT | Performed by: PHYSICIAN ASSISTANT

## 2018-01-01 PROCEDURE — 85610 PROTHROMBIN TIME: CPT | Performed by: PHYSICIAN ASSISTANT

## 2018-01-01 PROCEDURE — 31652 BRONCH EBUS SAMPLNG 1/2 NODE: CPT | Performed by: INTERNAL MEDICINE

## 2018-01-01 RX ORDER — MAGNESIUM SULFATE 1 G/100ML
1 INJECTION INTRAVENOUS ONCE
Status: COMPLETED | OUTPATIENT
Start: 2018-01-01 | End: 2018-01-01

## 2018-01-01 RX ORDER — PEDIATRIC MULTIPLE VITAMIN LIQ
5 LIQUID ORAL DAILY
Status: DISCONTINUED | OUTPATIENT
Start: 2018-01-01 | End: 2018-01-01

## 2018-01-01 RX ORDER — ALBUTEROL SULFATE 90 UG/1
2 AEROSOL, METERED RESPIRATORY (INHALATION) EVERY 4 HOURS PRN
Status: CANCELLED | OUTPATIENT
Start: 2018-01-01

## 2018-01-01 RX ORDER — MORPHINE SULFATE/0.9% NACL/PF 1 MG/ML
SYRINGE (ML) INJECTION CONTINUOUS
Status: DISCONTINUED | OUTPATIENT
Start: 2018-01-01 | End: 2018-03-22 | Stop reason: HOSPADM

## 2018-01-01 RX ORDER — LORAZEPAM 2 MG/ML
1 INJECTION INTRAMUSCULAR EVERY 4 HOURS PRN
Status: DISCONTINUED | OUTPATIENT
Start: 2018-01-01 | End: 2018-03-22 | Stop reason: HOSPADM

## 2018-01-01 RX ORDER — HYDRALAZINE HYDROCHLORIDE 20 MG/ML
10 INJECTION INTRAMUSCULAR; INTRAVENOUS EVERY 6 HOURS PRN
Status: DISCONTINUED | OUTPATIENT
Start: 2018-01-01 | End: 2018-03-22 | Stop reason: HOSPADM

## 2018-01-01 RX ORDER — LORAZEPAM 2 MG/ML
1 INJECTION INTRAMUSCULAR ONCE
Status: COMPLETED | OUTPATIENT
Start: 2018-01-01 | End: 2018-01-01

## 2018-01-01 RX ORDER — PANTOPRAZOLE SODIUM 40 MG/10ML
40 INJECTION, POWDER, LYOPHILIZED, FOR SOLUTION INTRAVENOUS
Status: DISCONTINUED | OUTPATIENT
Start: 2018-01-01 | End: 2018-01-01

## 2018-01-01 RX ORDER — FOLIC ACID 1 MG/1
1 TABLET ORAL DAILY
Status: CANCELLED | OUTPATIENT
Start: 2018-01-01

## 2018-01-01 RX ORDER — MORPHINE SULFATE/0.9% NACL/PF 1 MG/ML
SYRINGE (ML) INJECTION CONTINUOUS
Status: DISCONTINUED | OUTPATIENT
Start: 2018-01-01 | End: 2018-01-01

## 2018-01-01 RX ORDER — METHYLPREDNISOLONE SODIUM SUCCINATE 125 MG/2ML
60 INJECTION, POWDER, LYOPHILIZED, FOR SOLUTION INTRAMUSCULAR; INTRAVENOUS EVERY 12 HOURS SCHEDULED
Status: DISCONTINUED | OUTPATIENT
Start: 2018-01-01 | End: 2018-01-01

## 2018-01-01 RX ORDER — IPRATROPIUM BROMIDE AND ALBUTEROL SULFATE 2.5; .5 MG/3ML; MG/3ML
3 SOLUTION RESPIRATORY (INHALATION) EVERY 6 HOURS PRN
Status: DISCONTINUED | OUTPATIENT
Start: 2018-01-01 | End: 2018-01-01 | Stop reason: HOSPADM

## 2018-01-01 RX ORDER — SODIUM CHLORIDE 450 MG/100ML
100 INJECTION, SOLUTION INTRAVENOUS CONTINUOUS
Status: DISCONTINUED | OUTPATIENT
Start: 2018-01-01 | End: 2018-01-01

## 2018-01-01 RX ORDER — METHYLPREDNISOLONE SODIUM SUCCINATE 40 MG/ML
40 INJECTION, POWDER, LYOPHILIZED, FOR SOLUTION INTRAMUSCULAR; INTRAVENOUS DAILY
Status: DISCONTINUED | OUTPATIENT
Start: 2018-01-01 | End: 2018-01-01

## 2018-01-01 RX ORDER — LORAZEPAM 2 MG/ML
INJECTION INTRAMUSCULAR
Status: COMPLETED
Start: 2018-01-01 | End: 2018-01-01

## 2018-01-01 RX ORDER — SODIUM CHLORIDE 0.9 % (FLUSH) 0.9 %
10 SYRINGE (ML) INJECTION AS NEEDED
Status: DISCONTINUED | OUTPATIENT
Start: 2018-01-01 | End: 2018-03-22 | Stop reason: HOSPADM

## 2018-01-01 RX ORDER — FOLIC ACID 1 MG/1
1 TABLET ORAL DAILY
Qty: 30 TABLET | Refills: 2 | Status: SHIPPED | OUTPATIENT
Start: 2018-01-01

## 2018-01-01 RX ORDER — POTASSIUM CHLORIDE 7.45 MG/ML
10 INJECTION INTRAVENOUS ONCE
Status: COMPLETED | OUTPATIENT
Start: 2018-01-01 | End: 2018-01-01

## 2018-01-01 RX ORDER — SODIUM CHLORIDE 0.9 % (FLUSH) 0.9 %
1-10 SYRINGE (ML) INJECTION AS NEEDED
Status: DISCONTINUED | OUTPATIENT
Start: 2018-01-01 | End: 2018-01-01 | Stop reason: HOSPADM

## 2018-01-01 RX ORDER — LORAZEPAM 2 MG/ML
INJECTION INTRAMUSCULAR
Status: DISPENSED
Start: 2018-01-01 | End: 2018-01-01

## 2018-01-01 RX ORDER — METHYLPREDNISOLONE SODIUM SUCCINATE 40 MG/ML
40 INJECTION, POWDER, LYOPHILIZED, FOR SOLUTION INTRAMUSCULAR; INTRAVENOUS EVERY 12 HOURS
Status: DISCONTINUED | OUTPATIENT
Start: 2018-01-01 | End: 2018-01-01

## 2018-01-01 RX ORDER — BUDESONIDE AND FORMOTEROL FUMARATE DIHYDRATE 80; 4.5 UG/1; UG/1
2 AEROSOL RESPIRATORY (INHALATION)
Status: DISCONTINUED | OUTPATIENT
Start: 2018-01-01 | End: 2018-01-01 | Stop reason: HOSPADM

## 2018-01-01 RX ORDER — METHYLPREDNISOLONE SODIUM SUCCINATE 40 MG/ML
80 INJECTION, POWDER, LYOPHILIZED, FOR SOLUTION INTRAMUSCULAR; INTRAVENOUS ONCE
Status: COMPLETED | OUTPATIENT
Start: 2018-01-01 | End: 2018-01-01

## 2018-01-01 RX ORDER — DEXTROSE MONOHYDRATE 25 G/50ML
25 INJECTION, SOLUTION INTRAVENOUS
Status: DISCONTINUED | OUTPATIENT
Start: 2018-01-01 | End: 2018-01-01 | Stop reason: HOSPADM

## 2018-01-01 RX ORDER — LORAZEPAM 2 MG/ML
0.5 INJECTION INTRAMUSCULAR ONCE
Status: COMPLETED | OUTPATIENT
Start: 2018-01-01 | End: 2018-01-01

## 2018-01-01 RX ORDER — NICOTINE 21 MG/24HR
1 PATCH, TRANSDERMAL 24 HOURS TRANSDERMAL
Status: DISCONTINUED | OUTPATIENT
Start: 2018-01-01 | End: 2018-01-01 | Stop reason: HOSPADM

## 2018-01-01 RX ORDER — SODIUM CHLORIDE 9 MG/ML
INJECTION, SOLUTION INTRAVENOUS
Status: COMPLETED
Start: 2018-01-01 | End: 2018-01-01

## 2018-01-01 RX ORDER — MIDAZOLAM HYDROCHLORIDE 1 MG/ML
2 INJECTION INTRAMUSCULAR; INTRAVENOUS
Status: DISCONTINUED | OUTPATIENT
Start: 2018-01-01 | End: 2018-01-01

## 2018-01-01 RX ORDER — METHYLPREDNISOLONE SODIUM SUCCINATE 125 MG/2ML
60 INJECTION, POWDER, LYOPHILIZED, FOR SOLUTION INTRAMUSCULAR; INTRAVENOUS ONCE
Status: COMPLETED | OUTPATIENT
Start: 2018-01-01 | End: 2018-01-01

## 2018-01-01 RX ORDER — LORAZEPAM 2 MG/ML
0.5 INJECTION INTRAMUSCULAR NIGHTLY
Status: DISCONTINUED | OUTPATIENT
Start: 2018-01-01 | End: 2018-03-22 | Stop reason: HOSPADM

## 2018-01-01 RX ORDER — IPRATROPIUM BROMIDE AND ALBUTEROL SULFATE 2.5; .5 MG/3ML; MG/3ML
3 SOLUTION RESPIRATORY (INHALATION)
Status: DISCONTINUED | OUTPATIENT
Start: 2018-01-01 | End: 2018-01-01

## 2018-01-01 RX ORDER — PREDNISONE 20 MG/1
40 TABLET ORAL
Status: CANCELLED | OUTPATIENT
Start: 2018-01-01

## 2018-01-01 RX ORDER — GLYCOPYRROLATE 0.2 MG/ML
0.4 INJECTION INTRAMUSCULAR; INTRAVENOUS EVERY 4 HOURS PRN
Status: DISCONTINUED | OUTPATIENT
Start: 2018-01-01 | End: 2018-03-22 | Stop reason: HOSPADM

## 2018-01-01 RX ORDER — NICOTINE 21 MG/24HR
1 PATCH, TRANSDERMAL 24 HOURS TRANSDERMAL
Status: CANCELLED | OUTPATIENT
Start: 2018-01-01

## 2018-01-01 RX ORDER — MULTIPLE VITAMINS W/ MINERALS TAB 9MG-400MCG
1 TAB ORAL DAILY
Status: DISCONTINUED | OUTPATIENT
Start: 2018-01-01 | End: 2018-01-01 | Stop reason: HOSPADM

## 2018-01-01 RX ORDER — ALBUTEROL SULFATE 2.5 MG/3ML
2.5 SOLUTION RESPIRATORY (INHALATION) EVERY 4 HOURS PRN
Status: CANCELLED | OUTPATIENT
Start: 2018-01-01

## 2018-01-01 RX ORDER — BUDESONIDE AND FORMOTEROL FUMARATE DIHYDRATE 80; 4.5 UG/1; UG/1
2 AEROSOL RESPIRATORY (INHALATION)
Qty: 1 INHALER | Refills: 12 | Status: SHIPPED | OUTPATIENT
Start: 2018-01-01

## 2018-01-01 RX ORDER — METOPROLOL TARTRATE 50 MG/1
50 TABLET, FILM COATED ORAL EVERY 12 HOURS SCHEDULED
Status: DISCONTINUED | OUTPATIENT
Start: 2018-01-01 | End: 2018-01-01

## 2018-01-01 RX ORDER — ALPRAZOLAM 1 MG/1
1 TABLET ORAL 2 TIMES DAILY PRN
COMMUNITY

## 2018-01-01 RX ORDER — FOLIC ACID 1 MG/1
1 TABLET ORAL DAILY
Status: DISCONTINUED | OUTPATIENT
Start: 2018-01-01 | End: 2018-01-01 | Stop reason: HOSPADM

## 2018-01-01 RX ORDER — IPRATROPIUM BROMIDE AND ALBUTEROL SULFATE 2.5; .5 MG/3ML; MG/3ML
3 SOLUTION RESPIRATORY (INHALATION) ONCE
Status: COMPLETED | OUTPATIENT
Start: 2018-01-01 | End: 2018-01-01

## 2018-01-01 RX ORDER — IPRATROPIUM BROMIDE AND ALBUTEROL SULFATE 2.5; .5 MG/3ML; MG/3ML
3 SOLUTION RESPIRATORY (INHALATION) EVERY 4 HOURS PRN
Status: DISCONTINUED | OUTPATIENT
Start: 2018-01-01 | End: 2018-01-01

## 2018-01-01 RX ORDER — PREDNISONE 20 MG/1
40 TABLET ORAL
Qty: 2 TABLET | Refills: 0 | Status: SHIPPED | OUTPATIENT
Start: 2018-01-01 | End: 2018-01-01

## 2018-01-01 RX ORDER — HEPARIN SODIUM 5000 [USP'U]/ML
5000 INJECTION, SOLUTION INTRAVENOUS; SUBCUTANEOUS EVERY 12 HOURS SCHEDULED
Status: DISCONTINUED | OUTPATIENT
Start: 2018-01-01 | End: 2018-01-01 | Stop reason: HOSPADM

## 2018-01-01 RX ORDER — FAMOTIDINE 20 MG/1
40 TABLET, FILM COATED ORAL DAILY
Status: DISCONTINUED | OUTPATIENT
Start: 2018-01-01 | End: 2018-01-01 | Stop reason: HOSPADM

## 2018-01-01 RX ORDER — NALOXONE HCL 0.4 MG/ML
0.1 VIAL (ML) INJECTION
Status: DISCONTINUED | OUTPATIENT
Start: 2018-01-01 | End: 2018-01-01

## 2018-01-01 RX ORDER — FLUOXETINE HYDROCHLORIDE 20 MG/1
60 CAPSULE ORAL DAILY
COMMUNITY

## 2018-01-01 RX ORDER — MIDAZOLAM HYDROCHLORIDE 1 MG/ML
INJECTION INTRAMUSCULAR; INTRAVENOUS
Status: DISPENSED
Start: 2018-01-01 | End: 2018-01-01

## 2018-01-01 RX ORDER — SODIUM CHLORIDE 9 MG/ML
100 INJECTION, SOLUTION INTRAVENOUS CONTINUOUS
Status: DISCONTINUED | OUTPATIENT
Start: 2018-01-01 | End: 2018-01-01

## 2018-01-01 RX ORDER — NICOTINE 21 MG/24HR
1 PATCH, TRANSDERMAL 24 HOURS TRANSDERMAL
Qty: 12 PATCH | Refills: 0 | Status: SHIPPED | OUTPATIENT
Start: 2018-01-01

## 2018-01-01 RX ORDER — NICOTINE POLACRILEX 4 MG
15 LOZENGE BUCCAL
Status: DISCONTINUED | OUTPATIENT
Start: 2018-01-01 | End: 2018-01-01

## 2018-01-01 RX ORDER — NICOTINE POLACRILEX 4 MG
15 LOZENGE BUCCAL
Status: DISCONTINUED | OUTPATIENT
Start: 2018-01-01 | End: 2018-01-01 | Stop reason: HOSPADM

## 2018-01-01 RX ORDER — SODIUM CHLORIDE 0.9 % (FLUSH) 0.9 %
1-10 SYRINGE (ML) INJECTION AS NEEDED
Status: DISCONTINUED | OUTPATIENT
Start: 2018-01-01 | End: 2018-03-22 | Stop reason: HOSPADM

## 2018-01-01 RX ORDER — PROPOFOL 10 MG/ML
VIAL (ML) INTRAVENOUS
Status: COMPLETED
Start: 2018-01-01 | End: 2018-01-01

## 2018-01-01 RX ORDER — SODIUM CHLORIDE 0.9 % (FLUSH) 0.9 %
10 SYRINGE (ML) INJECTION AS NEEDED
Status: DISCONTINUED | OUTPATIENT
Start: 2018-01-01 | End: 2018-01-01 | Stop reason: HOSPADM

## 2018-01-01 RX ORDER — FUROSEMIDE 20 MG/1
20 TABLET ORAL DAILY
COMMUNITY
End: 2018-01-01 | Stop reason: HOSPADM

## 2018-01-01 RX ORDER — IPRATROPIUM BROMIDE AND ALBUTEROL SULFATE 2.5; .5 MG/3ML; MG/3ML
3 SOLUTION RESPIRATORY (INHALATION) EVERY 6 HOURS PRN
Qty: 360 ML | Refills: 12 | Status: SHIPPED | OUTPATIENT
Start: 2018-01-01

## 2018-01-01 RX ORDER — CHLORHEXIDINE GLUCONATE 0.12 MG/ML
15 RINSE ORAL EVERY 12 HOURS SCHEDULED
Status: DISCONTINUED | OUTPATIENT
Start: 2018-01-01 | End: 2018-01-01

## 2018-01-01 RX ORDER — HEPARIN SODIUM 5000 [USP'U]/ML
5000 INJECTION, SOLUTION INTRAVENOUS; SUBCUTANEOUS EVERY 12 HOURS SCHEDULED
Status: DISCONTINUED | OUTPATIENT
Start: 2018-01-01 | End: 2018-01-01

## 2018-01-01 RX ORDER — LORAZEPAM 2 MG/ML
2 INJECTION INTRAMUSCULAR ONCE
Status: COMPLETED | OUTPATIENT
Start: 2018-01-01 | End: 2018-01-01

## 2018-01-01 RX ORDER — POTASSIUM CHLORIDE 1.5 G/1.77G
40 POWDER, FOR SOLUTION ORAL 2 TIMES DAILY
Status: COMPLETED | OUTPATIENT
Start: 2018-01-01 | End: 2018-01-01

## 2018-01-01 RX ORDER — BUDESONIDE AND FORMOTEROL FUMARATE DIHYDRATE 80; 4.5 UG/1; UG/1
2 AEROSOL RESPIRATORY (INHALATION)
Status: CANCELLED | OUTPATIENT
Start: 2018-01-01

## 2018-01-01 RX ORDER — POTASSIUM CHLORIDE 20 MEQ/1
40 TABLET, EXTENDED RELEASE ORAL ONCE
Status: COMPLETED | OUTPATIENT
Start: 2018-01-01 | End: 2018-01-01

## 2018-01-01 RX ORDER — AMLODIPINE BESYLATE 10 MG/1
10 TABLET ORAL DAILY
COMMUNITY
End: 2018-01-01 | Stop reason: HOSPADM

## 2018-01-01 RX ORDER — ALBUTEROL SULFATE 90 UG/1
2 AEROSOL, METERED RESPIRATORY (INHALATION) EVERY 4 HOURS PRN
COMMUNITY

## 2018-01-01 RX ORDER — PREDNISONE 20 MG/1
40 TABLET ORAL
Status: DISCONTINUED | OUTPATIENT
Start: 2018-01-01 | End: 2018-01-01

## 2018-01-01 RX ORDER — CALCIPOTRIENE 50 UG/G
1 CREAM TOPICAL AS NEEDED
COMMUNITY

## 2018-01-01 RX ORDER — MORPHINE SULFATE 2 MG/ML
2 INJECTION, SOLUTION INTRAMUSCULAR; INTRAVENOUS
Status: DISCONTINUED | OUTPATIENT
Start: 2018-01-01 | End: 2018-03-22 | Stop reason: HOSPADM

## 2018-01-01 RX ORDER — ACETAMINOPHEN 325 MG/1
650 TABLET ORAL EVERY 6 HOURS PRN
Status: DISCONTINUED | OUTPATIENT
Start: 2018-01-01 | End: 2018-01-01

## 2018-01-01 RX ORDER — IPRATROPIUM BROMIDE AND ALBUTEROL SULFATE 2.5; .5 MG/3ML; MG/3ML
3 SOLUTION RESPIRATORY (INHALATION) EVERY 6 HOURS PRN
Status: CANCELLED | OUTPATIENT
Start: 2018-01-01

## 2018-01-01 RX ORDER — METOPROLOL TARTRATE 50 MG/1
25 TABLET, FILM COATED ORAL 2 TIMES DAILY
Status: ON HOLD | COMMUNITY
End: 2018-01-01

## 2018-01-01 RX ORDER — DEXTROSE MONOHYDRATE 25 G/50ML
25 INJECTION, SOLUTION INTRAVENOUS
Status: DISCONTINUED | OUTPATIENT
Start: 2018-01-01 | End: 2018-01-01

## 2018-01-01 RX ORDER — MIDAZOLAM HYDROCHLORIDE 1 MG/ML
INJECTION INTRAMUSCULAR; INTRAVENOUS
Status: DISCONTINUED
Start: 2018-01-01 | End: 2018-01-01 | Stop reason: WASHOUT

## 2018-01-01 RX ORDER — LIDOCAINE HYDROCHLORIDE 10 MG/ML
20 INJECTION, SOLUTION EPIDURAL; INFILTRATION; INTRACAUDAL; PERINEURAL ONCE
Status: DISCONTINUED | OUTPATIENT
Start: 2018-01-01 | End: 2018-01-01 | Stop reason: HOSPADM

## 2018-01-01 RX ORDER — IPRATROPIUM BROMIDE AND ALBUTEROL SULFATE 2.5; .5 MG/3ML; MG/3ML
3 SOLUTION RESPIRATORY (INHALATION)
Status: DISCONTINUED | OUTPATIENT
Start: 2018-01-01 | End: 2018-01-01 | Stop reason: HOSPADM

## 2018-01-01 RX ORDER — FLUOXETINE HYDROCHLORIDE 20 MG/1
60 CAPSULE ORAL DAILY
Status: CANCELLED | OUTPATIENT
Start: 2018-01-01

## 2018-01-01 RX ORDER — ALPRAZOLAM 1 MG/1
1 TABLET ORAL 2 TIMES DAILY PRN
Status: CANCELLED | OUTPATIENT
Start: 2018-01-01

## 2018-01-01 RX ORDER — CASTOR OIL AND BALSAM, PERU 788; 87 MG/G; MG/G
OINTMENT TOPICAL EVERY 12 HOURS SCHEDULED
Status: DISCONTINUED | OUTPATIENT
Start: 2018-01-01 | End: 2018-03-22 | Stop reason: HOSPADM

## 2018-01-01 RX ORDER — LEVOFLOXACIN 5 MG/ML
500 INJECTION, SOLUTION INTRAVENOUS EVERY 24 HOURS
Status: DISCONTINUED | OUTPATIENT
Start: 2018-01-01 | End: 2018-01-01

## 2018-01-01 RX ORDER — SODIUM CHLORIDE 0.9 % (FLUSH) 0.9 %
10 SYRINGE (ML) INJECTION EVERY 12 HOURS SCHEDULED
Status: DISCONTINUED | OUTPATIENT
Start: 2018-01-01 | End: 2018-03-22 | Stop reason: HOSPADM

## 2018-01-01 RX ORDER — ALBUTEROL SULFATE 2.5 MG/3ML
2.5 SOLUTION RESPIRATORY (INHALATION) ONCE
Status: COMPLETED | OUTPATIENT
Start: 2018-01-01 | End: 2018-01-01

## 2018-01-01 RX ORDER — METHYLPREDNISOLONE SODIUM SUCCINATE 125 MG/2ML
60 INJECTION, POWDER, LYOPHILIZED, FOR SOLUTION INTRAMUSCULAR; INTRAVENOUS EVERY 12 HOURS
Status: DISCONTINUED | OUTPATIENT
Start: 2018-01-01 | End: 2018-01-01

## 2018-01-01 RX ORDER — NYSTATIN 100000 [USP'U]/G
POWDER TOPICAL EVERY 12 HOURS SCHEDULED
Status: DISCONTINUED | OUTPATIENT
Start: 2018-01-01 | End: 2018-01-01

## 2018-01-01 RX ORDER — MORPHINE SULFATE 10 MG/ML
10 INJECTION INTRAMUSCULAR; INTRAVENOUS; SUBCUTANEOUS EVERY 4 HOURS PRN
Status: DISCONTINUED | OUTPATIENT
Start: 2018-01-01 | End: 2018-01-01

## 2018-01-01 RX ORDER — ALPRAZOLAM 1 MG/1
1 TABLET ORAL 2 TIMES DAILY PRN
Status: DISCONTINUED | OUTPATIENT
Start: 2018-01-01 | End: 2018-01-01 | Stop reason: HOSPADM

## 2018-01-01 RX ORDER — ENALAPRIL MALEATE 10 MG/1
10 TABLET ORAL 2 TIMES DAILY
COMMUNITY
End: 2018-01-01 | Stop reason: HOSPADM

## 2018-01-01 RX ORDER — PREDNISONE 20 MG/1
40 TABLET ORAL
Status: DISCONTINUED | OUTPATIENT
Start: 2018-01-01 | End: 2018-01-01 | Stop reason: HOSPADM

## 2018-01-01 RX ORDER — FLUOXETINE HYDROCHLORIDE 20 MG/1
20 CAPSULE ORAL DAILY
Status: DISCONTINUED | OUTPATIENT
Start: 2018-01-01 | End: 2018-01-01 | Stop reason: HOSPADM

## 2018-01-01 RX ORDER — DOXYCYCLINE HYCLATE 100 MG/1
100 TABLET, DELAYED RELEASE ORAL 2 TIMES DAILY
Qty: 20 TABLET | Refills: 0 | Status: SHIPPED | OUTPATIENT
Start: 2018-01-01 | End: 2018-01-01

## 2018-01-01 RX ADMIN — METHYLPREDNISOLONE SODIUM SUCCINATE 40 MG: 40 INJECTION, POWDER, FOR SOLUTION INTRAMUSCULAR; INTRAVENOUS at 20:16

## 2018-01-01 RX ADMIN — LEVOFLOXACIN 500 MG: 5 INJECTION, SOLUTION INTRAVENOUS at 12:22

## 2018-01-01 RX ADMIN — INSULIN ASPART 2 UNITS: 100 INJECTION, SOLUTION INTRAVENOUS; SUBCUTANEOUS at 07:42

## 2018-01-01 RX ADMIN — IPRATROPIUM BROMIDE AND ALBUTEROL SULFATE 3 ML: .5; 3 SOLUTION RESPIRATORY (INHALATION) at 08:56

## 2018-01-01 RX ADMIN — MORPHINE SULFATE 4 MG: 4 INJECTION INTRAVENOUS at 20:05

## 2018-01-01 RX ADMIN — HEPARIN SODIUM 5000 UNITS: 5000 INJECTION, SOLUTION INTRAVENOUS; SUBCUTANEOUS at 20:33

## 2018-01-01 RX ADMIN — IOPAMIDOL 70 ML: 755 INJECTION, SOLUTION INTRAVENOUS at 18:15

## 2018-01-01 RX ADMIN — VANCOMYCIN HYDROCHLORIDE 750 MG: 5 INJECTION, POWDER, LYOPHILIZED, FOR SOLUTION INTRAVENOUS at 20:59

## 2018-01-01 RX ADMIN — NYSTATIN: 100000 POWDER TOPICAL at 00:27

## 2018-01-01 RX ADMIN — DOXYCYCLINE 100 MG: 100 INJECTION, POWDER, LYOPHILIZED, FOR SOLUTION INTRAVENOUS at 02:12

## 2018-01-01 RX ADMIN — CASTOR OIL AND BALSAM, PERU: 788; 87 OINTMENT TOPICAL at 21:13

## 2018-01-01 RX ADMIN — NYSTATIN: 100000 POWDER TOPICAL at 08:32

## 2018-01-01 RX ADMIN — PIPERACILLIN SODIUM,TAZOBACTAM SODIUM 3.38 G: 3; .375 INJECTION, POWDER, FOR SOLUTION INTRAVENOUS at 22:47

## 2018-01-01 RX ADMIN — IPRATROPIUM BROMIDE 0.5 MG: 0.5 SOLUTION RESPIRATORY (INHALATION) at 00:29

## 2018-01-01 RX ADMIN — PANTOPRAZOLE SODIUM 40 MG: 40 INJECTION, POWDER, FOR SOLUTION INTRAVENOUS at 05:53

## 2018-01-01 RX ADMIN — SODIUM CHLORIDE 500 ML: 9 INJECTION, SOLUTION INTRAVENOUS at 09:03

## 2018-01-01 RX ADMIN — LORAZEPAM 1 MG: 2 INJECTION INTRAMUSCULAR at 08:04

## 2018-01-01 RX ADMIN — DOXYCYCLINE 100 MG: 100 INJECTION, POWDER, LYOPHILIZED, FOR SOLUTION INTRAVENOUS at 00:05

## 2018-01-01 RX ADMIN — CHLORHEXIDINE GLUCONATE 15 ML: 1.2 RINSE ORAL at 21:13

## 2018-01-01 RX ADMIN — Medication 5 ML: at 06:51

## 2018-01-01 RX ADMIN — MAGNESIUM GLUCONATE 500 MG ORAL TABLET 400 MG: 500 TABLET ORAL at 08:33

## 2018-01-01 RX ADMIN — MAGNESIUM GLUCONATE 500 MG ORAL TABLET 400 MG: 500 TABLET ORAL at 09:07

## 2018-01-01 RX ADMIN — PROPOFOL 50 MCG/KG/MIN: 10 INJECTION, EMULSION INTRAVENOUS at 02:07

## 2018-01-01 RX ADMIN — DEXMEDETOMIDINE HYDROCHLORIDE 1.2 MCG/KG/HR: 100 INJECTION, SOLUTION INTRAVENOUS at 00:05

## 2018-01-01 RX ADMIN — SODIUM CHLORIDE 500 ML: 9 INJECTION, SOLUTION INTRAVENOUS at 10:46

## 2018-01-01 RX ADMIN — METHYLPREDNISOLONE SODIUM SUCCINATE 40 MG: 40 INJECTION, POWDER, FOR SOLUTION INTRAMUSCULAR; INTRAVENOUS at 20:33

## 2018-01-01 RX ADMIN — PROPOFOL 50 MCG/KG/MIN: 10 INJECTION, EMULSION INTRAVENOUS at 04:23

## 2018-01-01 RX ADMIN — FAMOTIDINE 40 MG: 20 TABLET, FILM COATED ORAL at 08:13

## 2018-01-01 RX ADMIN — PROPOFOL 40 MCG/KG/MIN: 10 INJECTION, EMULSION INTRAVENOUS at 21:52

## 2018-01-01 RX ADMIN — VANCOMYCIN HYDROCHLORIDE 750 MG: 5 INJECTION, POWDER, LYOPHILIZED, FOR SOLUTION INTRAVENOUS at 09:25

## 2018-01-01 RX ADMIN — SODIUM CHLORIDE 100 ML/HR: 9 INJECTION, SOLUTION INTRAVENOUS at 05:53

## 2018-01-01 RX ADMIN — DEXMEDETOMIDINE HYDROCHLORIDE 1.5 MCG/KG/HR: 100 INJECTION, SOLUTION INTRAVENOUS at 17:37

## 2018-01-01 RX ADMIN — DEXMEDETOMIDINE HYDROCHLORIDE 1.5 MCG/KG/HR: 100 INJECTION, SOLUTION INTRAVENOUS at 21:12

## 2018-01-01 RX ADMIN — CHLORHEXIDINE GLUCONATE 15 ML: 1.2 RINSE ORAL at 08:52

## 2018-01-01 RX ADMIN — POTASSIUM CHLORIDE 40 MEQ: 1500 TABLET, EXTENDED RELEASE ORAL at 18:20

## 2018-01-01 RX ADMIN — ALPRAZOLAM 1 MG: 1 TABLET ORAL at 19:41

## 2018-01-01 RX ADMIN — Medication: at 03:42

## 2018-01-01 RX ADMIN — FOLIC ACID 1 MG: 1 TABLET ORAL at 08:13

## 2018-01-01 RX ADMIN — IPRATROPIUM BROMIDE 0.5 MG: 0.5 SOLUTION RESPIRATORY (INHALATION) at 18:27

## 2018-01-01 RX ADMIN — PROPOFOL 45 MCG/KG/MIN: 10 INJECTION, EMULSION INTRAVENOUS at 18:38

## 2018-01-01 RX ADMIN — FLUOXETINE HYDROCHLORIDE 20 MG: 20 CAPSULE ORAL at 08:11

## 2018-01-01 RX ADMIN — PIPERACILLIN SODIUM,TAZOBACTAM SODIUM 3.38 G: 3; .375 INJECTION, POWDER, FOR SOLUTION INTRAVENOUS at 22:50

## 2018-01-01 RX ADMIN — METHYLPREDNISOLONE SODIUM SUCCINATE 40 MG: 40 INJECTION, POWDER, FOR SOLUTION INTRAMUSCULAR; INTRAVENOUS at 08:18

## 2018-01-01 RX ADMIN — PIPERACILLIN SODIUM,TAZOBACTAM SODIUM 3.38 G: 3; .375 INJECTION, POWDER, FOR SOLUTION INTRAVENOUS at 22:43

## 2018-01-01 RX ADMIN — MAGNESIUM GLUCONATE 500 MG ORAL TABLET 400 MG: 500 TABLET ORAL at 08:18

## 2018-01-01 RX ADMIN — DEXMEDETOMIDINE HYDROCHLORIDE 1.5 MCG/KG/HR: 100 INJECTION, SOLUTION INTRAVENOUS at 15:56

## 2018-01-01 RX ADMIN — PROPOFOL 50 MCG/KG/MIN: 10 INJECTION, EMULSION INTRAVENOUS at 08:26

## 2018-01-01 RX ADMIN — TAZOBACTAM SODIUM AND PIPERACILLIN SODIUM 4.5 G: .5; 4 INJECTION, POWDER, LYOPHILIZED, FOR SOLUTION INTRAVENOUS at 09:55

## 2018-01-01 RX ADMIN — IPRATROPIUM BROMIDE 0.5 MG: 0.5 SOLUTION RESPIRATORY (INHALATION) at 12:26

## 2018-01-01 RX ADMIN — DOXYCYCLINE 100 MG: 100 INJECTION, POWDER, LYOPHILIZED, FOR SOLUTION INTRAVENOUS at 01:00

## 2018-01-01 RX ADMIN — CASTOR OIL AND BALSAM, PERU: 788; 87 OINTMENT TOPICAL at 08:59

## 2018-01-01 RX ADMIN — PROPOFOL 50 MCG/KG/MIN: 10 INJECTION, EMULSION INTRAVENOUS at 20:08

## 2018-01-01 RX ADMIN — PANTOPRAZOLE SODIUM 40 MG: 40 INJECTION, POWDER, FOR SOLUTION INTRAVENOUS at 06:00

## 2018-01-01 RX ADMIN — PROPOFOL 50 MCG/KG/MIN: 10 INJECTION, EMULSION INTRAVENOUS at 00:27

## 2018-01-01 RX ADMIN — HEPARIN SODIUM 5000 UNITS: 5000 INJECTION, SOLUTION INTRAVENOUS; SUBCUTANEOUS at 19:40

## 2018-01-01 RX ADMIN — DEXMEDETOMIDINE HYDROCHLORIDE 1.2 MCG/KG/HR: 100 INJECTION, SOLUTION INTRAVENOUS at 14:56

## 2018-01-01 RX ADMIN — HEPARIN SODIUM 5000 UNITS: 5000 INJECTION, SOLUTION INTRAVENOUS; SUBCUTANEOUS at 22:27

## 2018-01-01 RX ADMIN — PROPOFOL 50 MCG/KG/MIN: 10 INJECTION, EMULSION INTRAVENOUS at 05:18

## 2018-01-01 RX ADMIN — DEXMEDETOMIDINE HYDROCHLORIDE 1.5 MCG/KG/HR: 100 INJECTION, SOLUTION INTRAVENOUS at 01:43

## 2018-01-01 RX ADMIN — VANCOMYCIN HYDROCHLORIDE 750 MG: 5 INJECTION, POWDER, LYOPHILIZED, FOR SOLUTION INTRAVENOUS at 14:26

## 2018-01-01 RX ADMIN — DEXMEDETOMIDINE HYDROCHLORIDE 1.2 MCG/KG/HR: 100 INJECTION, SOLUTION INTRAVENOUS at 17:09

## 2018-01-01 RX ADMIN — IPRATROPIUM BROMIDE AND ALBUTEROL SULFATE 3 ML: 2.5; .5 SOLUTION RESPIRATORY (INHALATION) at 15:50

## 2018-01-01 RX ADMIN — PROPOFOL 50 MCG/KG/MIN: 10 INJECTION, EMULSION INTRAVENOUS at 11:28

## 2018-01-01 RX ADMIN — PREDNISONE 40 MG: 20 TABLET ORAL at 08:13

## 2018-01-01 RX ADMIN — PIPERACILLIN SODIUM,TAZOBACTAM SODIUM 3.38 G: 3; .375 INJECTION, POWDER, FOR SOLUTION INTRAVENOUS at 06:46

## 2018-01-01 RX ADMIN — IPRATROPIUM BROMIDE 0.5 MG: 0.5 SOLUTION RESPIRATORY (INHALATION) at 12:34

## 2018-01-01 RX ADMIN — ALPRAZOLAM 1 MG: 1 TABLET ORAL at 16:43

## 2018-01-01 RX ADMIN — GLYCOPYRROLATE 0.4 MG: 0.2 INJECTION, SOLUTION INTRAMUSCULAR; INTRAVENOUS at 22:26

## 2018-01-01 RX ADMIN — Medication 5 ML: at 23:06

## 2018-01-01 RX ADMIN — IPRATROPIUM BROMIDE 0.5 MG: 0.5 SOLUTION RESPIRATORY (INHALATION) at 18:01

## 2018-01-01 RX ADMIN — GLYCOPYRROLATE 0.4 MG: 0.2 INJECTION, SOLUTION INTRAMUSCULAR; INTRAVENOUS at 05:49

## 2018-01-01 RX ADMIN — ALPRAZOLAM 1 MG: 1 TABLET ORAL at 15:02

## 2018-01-01 RX ADMIN — CHLORHEXIDINE GLUCONATE 15 ML: 1.2 RINSE ORAL at 20:35

## 2018-01-01 RX ADMIN — IPRATROPIUM BROMIDE 0.5 MG: 0.5 SOLUTION RESPIRATORY (INHALATION) at 00:11

## 2018-01-01 RX ADMIN — MULTIPLE VITAMINS W/ MINERALS TAB 1 TABLET: TAB ORAL at 09:25

## 2018-01-01 RX ADMIN — Medication: at 16:20

## 2018-01-01 RX ADMIN — Medication: at 18:57

## 2018-01-01 RX ADMIN — NYSTATIN: 100000 POWDER TOPICAL at 09:00

## 2018-01-01 RX ADMIN — CASTOR OIL AND BALSAM, PERU: 788; 87 OINTMENT TOPICAL at 08:37

## 2018-01-01 RX ADMIN — PROPOFOL 45 MCG/KG/MIN: 10 INJECTION, EMULSION INTRAVENOUS at 09:35

## 2018-01-01 RX ADMIN — DOXYCYCLINE 100 MG: 100 INJECTION, POWDER, LYOPHILIZED, FOR SOLUTION INTRAVENOUS at 00:54

## 2018-01-01 RX ADMIN — SULFUR HEXAFLUORIDE 2 ML: KIT at 20:00

## 2018-01-01 RX ADMIN — PROPOFOL 40 MCG/KG/MIN: 10 INJECTION, EMULSION INTRAVENOUS at 06:08

## 2018-01-01 RX ADMIN — DOXYCYCLINE 100 MG: 100 INJECTION, POWDER, LYOPHILIZED, FOR SOLUTION INTRAVENOUS at 03:28

## 2018-01-01 RX ADMIN — Medication 10 ML: at 20:16

## 2018-01-01 RX ADMIN — Medication 5 ML: at 22:27

## 2018-01-01 RX ADMIN — PROPOFOL 40 MCG/KG/MIN: 10 INJECTION, EMULSION INTRAVENOUS at 03:42

## 2018-01-01 RX ADMIN — IPRATROPIUM BROMIDE 0.5 MG: 0.5 SOLUTION RESPIRATORY (INHALATION) at 06:34

## 2018-01-01 RX ADMIN — SODIUM CHLORIDE 100 ML/HR: 9 INJECTION, SOLUTION INTRAVENOUS at 13:42

## 2018-01-01 RX ADMIN — MULTIPLE VITAMIN LIQUID 5 ML: LIQUID at 08:34

## 2018-01-01 RX ADMIN — DEXMEDETOMIDINE HYDROCHLORIDE 1.4 MCG/KG/HR: 100 INJECTION, SOLUTION INTRAVENOUS at 11:44

## 2018-01-01 RX ADMIN — PIPERACILLIN SODIUM,TAZOBACTAM SODIUM 3.38 G: 3; .375 INJECTION, POWDER, FOR SOLUTION INTRAVENOUS at 06:01

## 2018-01-01 RX ADMIN — PROPOFOL 50 MCG/KG/MIN: 10 INJECTION, EMULSION INTRAVENOUS at 18:23

## 2018-01-01 RX ADMIN — DEXMEDETOMIDINE HYDROCHLORIDE 1.5 MCG/KG/HR: 100 INJECTION, SOLUTION INTRAVENOUS at 01:59

## 2018-01-01 RX ADMIN — POTASSIUM CHLORIDE 40 MEQ: 1.5 POWDER, FOR SOLUTION ORAL at 09:25

## 2018-01-01 RX ADMIN — IPRATROPIUM BROMIDE 0.5 MG: 0.5 SOLUTION RESPIRATORY (INHALATION) at 18:50

## 2018-01-01 RX ADMIN — IPRATROPIUM BROMIDE 0.5 MG: 0.5 SOLUTION RESPIRATORY (INHALATION) at 00:36

## 2018-01-01 RX ADMIN — NYSTATIN: 100000 POWDER TOPICAL at 20:10

## 2018-01-01 RX ADMIN — CHLORHEXIDINE GLUCONATE 15 ML: 1.2 RINSE ORAL at 20:08

## 2018-01-01 RX ADMIN — MIDAZOLAM HYDROCHLORIDE 2 MG: 1 INJECTION, SOLUTION INTRAMUSCULAR; INTRAVENOUS at 20:33

## 2018-01-01 RX ADMIN — MIDAZOLAM HYDROCHLORIDE 2 MG: 1 INJECTION, SOLUTION INTRAMUSCULAR; INTRAVENOUS at 16:24

## 2018-01-01 RX ADMIN — INSULIN ASPART 2 UNITS: 100 INJECTION, SOLUTION INTRAVENOUS; SUBCUTANEOUS at 06:14

## 2018-01-01 RX ADMIN — PROPOFOL 50 MCG/KG/MIN: 10 INJECTION, EMULSION INTRAVENOUS at 05:58

## 2018-01-01 RX ADMIN — ALPRAZOLAM 1 MG: 1 TABLET ORAL at 09:25

## 2018-01-01 RX ADMIN — SODIUM CHLORIDE 100 ML/HR: 9 INJECTION, SOLUTION INTRAVENOUS at 04:18

## 2018-01-01 RX ADMIN — DEXMEDETOMIDINE HYDROCHLORIDE 1.4 MCG/KG/HR: 100 INJECTION, SOLUTION INTRAVENOUS at 16:50

## 2018-01-01 RX ADMIN — NYSTATIN: 100000 POWDER TOPICAL at 20:35

## 2018-01-01 RX ADMIN — NYSTATIN: 100000 POWDER TOPICAL at 08:37

## 2018-01-01 RX ADMIN — LORAZEPAM 2 MG: 2 INJECTION INTRAMUSCULAR at 12:58

## 2018-01-01 RX ADMIN — DEXMEDETOMIDINE HYDROCHLORIDE 1.5 MCG/KG/HR: 100 INJECTION, SOLUTION INTRAVENOUS at 20:15

## 2018-01-01 RX ADMIN — DEXMEDETOMIDINE HYDROCHLORIDE 1.4 MCG/KG/HR: 100 INJECTION, SOLUTION INTRAVENOUS at 20:26

## 2018-01-01 RX ADMIN — PIPERACILLIN SODIUM,TAZOBACTAM SODIUM 3.38 G: 3; .375 INJECTION, POWDER, FOR SOLUTION INTRAVENOUS at 06:13

## 2018-01-01 RX ADMIN — IPRATROPIUM BROMIDE 0.5 MG: 0.5 SOLUTION RESPIRATORY (INHALATION) at 06:41

## 2018-01-01 RX ADMIN — DEXMEDETOMIDINE HYDROCHLORIDE 1 MCG/KG/HR: 100 INJECTION, SOLUTION INTRAVENOUS at 09:22

## 2018-01-01 RX ADMIN — Medication 5 ML: at 06:02

## 2018-01-01 RX ADMIN — BUDESONIDE AND FORMOTEROL FUMARATE DIHYDRATE 2 PUFF: 80; 4.5 AEROSOL RESPIRATORY (INHALATION) at 20:02

## 2018-01-01 RX ADMIN — IPRATROPIUM BROMIDE 0.5 MG: 0.5 SOLUTION RESPIRATORY (INHALATION) at 01:43

## 2018-01-01 RX ADMIN — METOPROLOL TARTRATE 25 MG: 25 TABLET ORAL at 09:25

## 2018-01-01 RX ADMIN — Medication 5 ML: at 17:45

## 2018-01-01 RX ADMIN — DEXMEDETOMIDINE HYDROCHLORIDE 1.5 MCG/KG/HR: 100 INJECTION, SOLUTION INTRAVENOUS at 12:25

## 2018-01-01 RX ADMIN — CHLORHEXIDINE GLUCONATE 15 ML: 1.2 RINSE ORAL at 20:17

## 2018-01-01 RX ADMIN — VANCOMYCIN HYDROCHLORIDE 750 MG: 5 INJECTION, POWDER, LYOPHILIZED, FOR SOLUTION INTRAVENOUS at 08:28

## 2018-01-01 RX ADMIN — IPRATROPIUM BROMIDE 0.5 MG: 0.5 SOLUTION RESPIRATORY (INHALATION) at 00:33

## 2018-01-01 RX ADMIN — DEXMEDETOMIDINE HYDROCHLORIDE 1.2 MCG/KG/HR: 100 INJECTION, SOLUTION INTRAVENOUS at 10:32

## 2018-01-01 RX ADMIN — DEXMEDETOMIDINE HYDROCHLORIDE 1.5 MCG/KG/HR: 100 INJECTION, SOLUTION INTRAVENOUS at 05:46

## 2018-01-01 RX ADMIN — IPRATROPIUM BROMIDE 0.5 MG: 0.5 SOLUTION RESPIRATORY (INHALATION) at 00:44

## 2018-01-01 RX ADMIN — DEXMEDETOMIDINE HYDROCHLORIDE 1.5 MCG/KG/HR: 100 INJECTION, SOLUTION INTRAVENOUS at 02:31

## 2018-01-01 RX ADMIN — PROPOFOL 10 MCG/KG/MIN: 10 INJECTION, EMULSION INTRAVENOUS at 11:58

## 2018-01-01 RX ADMIN — CASTOR OIL AND BALSAM, PERU: 788; 87 OINTMENT TOPICAL at 22:48

## 2018-01-01 RX ADMIN — PIPERACILLIN SODIUM,TAZOBACTAM SODIUM 3.38 G: 3; .375 INJECTION, POWDER, FOR SOLUTION INTRAVENOUS at 06:08

## 2018-01-01 RX ADMIN — HEPARIN SODIUM 5000 UNITS: 5000 INJECTION, SOLUTION INTRAVENOUS; SUBCUTANEOUS at 12:51

## 2018-01-01 RX ADMIN — METHYLPREDNISOLONE SODIUM SUCCINATE 40 MG: 40 INJECTION, POWDER, FOR SOLUTION INTRAMUSCULAR; INTRAVENOUS at 08:49

## 2018-01-01 RX ADMIN — INSULIN ASPART 2 UNITS: 100 INJECTION, SOLUTION INTRAVENOUS; SUBCUTANEOUS at 06:23

## 2018-01-01 RX ADMIN — METHYLPREDNISOLONE SODIUM SUCCINATE 40 MG: 40 INJECTION, POWDER, FOR SOLUTION INTRAMUSCULAR; INTRAVENOUS at 09:06

## 2018-01-01 RX ADMIN — FLUOXETINE HYDROCHLORIDE 20 MG: 20 CAPSULE ORAL at 09:25

## 2018-01-01 RX ADMIN — Medication 10 ML: at 20:07

## 2018-01-01 RX ADMIN — IPRATROPIUM BROMIDE 0.5 MG: 0.5 SOLUTION RESPIRATORY (INHALATION) at 18:14

## 2018-01-01 RX ADMIN — DEXMEDETOMIDINE HYDROCHLORIDE 1.5 MCG/KG/HR: 100 INJECTION, SOLUTION INTRAVENOUS at 05:16

## 2018-01-01 RX ADMIN — MIDAZOLAM HYDROCHLORIDE 2 MG: 1 INJECTION, SOLUTION INTRAMUSCULAR; INTRAVENOUS at 23:00

## 2018-01-01 RX ADMIN — Medication 5 ML: at 12:15

## 2018-01-01 RX ADMIN — Medication 1 SPRAY: at 20:06

## 2018-01-01 RX ADMIN — PROPOFOL 35 MCG/KG/MIN: 10 INJECTION, EMULSION INTRAVENOUS at 16:14

## 2018-01-01 RX ADMIN — MULTIPLE VITAMIN LIQUID 5 ML: LIQUID at 08:58

## 2018-01-01 RX ADMIN — DOXYCYCLINE 100 MG: 100 INJECTION, POWDER, LYOPHILIZED, FOR SOLUTION INTRAVENOUS at 12:14

## 2018-01-01 RX ADMIN — Medication 10 ML: at 08:35

## 2018-01-01 RX ADMIN — MIDAZOLAM HYDROCHLORIDE 2 MG: 1 INJECTION, SOLUTION INTRAMUSCULAR; INTRAVENOUS at 12:45

## 2018-01-01 RX ADMIN — PIPERACILLIN SODIUM,TAZOBACTAM SODIUM 3.38 G: 3; .375 INJECTION, POWDER, FOR SOLUTION INTRAVENOUS at 06:41

## 2018-01-01 RX ADMIN — HEPARIN SODIUM 5000 UNITS: 5000 INJECTION, SOLUTION INTRAVENOUS; SUBCUTANEOUS at 20:34

## 2018-01-01 RX ADMIN — CEFTRIAXONE 1 G: 1 INJECTION, POWDER, FOR SOLUTION INTRAMUSCULAR; INTRAVENOUS at 19:08

## 2018-01-01 RX ADMIN — IPRATROPIUM BROMIDE 0.5 MG: 0.5 SOLUTION RESPIRATORY (INHALATION) at 06:29

## 2018-01-01 RX ADMIN — METHYLPREDNISOLONE SODIUM SUCCINATE 40 MG: 40 INJECTION, POWDER, FOR SOLUTION INTRAMUSCULAR; INTRAVENOUS at 08:56

## 2018-01-01 RX ADMIN — METHYLPREDNISOLONE SODIUM SUCCINATE 80 MG: 40 INJECTION, POWDER, FOR SOLUTION INTRAMUSCULAR; INTRAVENOUS at 15:44

## 2018-01-01 RX ADMIN — POTASSIUM CHLORIDE 10 MEQ: 7.46 INJECTION, SOLUTION INTRAVENOUS at 18:12

## 2018-01-01 RX ADMIN — CASTOR OIL AND BALSAM, PERU: 788; 87 OINTMENT TOPICAL at 20:59

## 2018-01-01 RX ADMIN — VANCOMYCIN HYDROCHLORIDE 750 MG: 5 INJECTION, POWDER, LYOPHILIZED, FOR SOLUTION INTRAVENOUS at 20:15

## 2018-01-01 RX ADMIN — Medication 1 SPRAY: at 17:49

## 2018-01-01 RX ADMIN — POLYVINYL ALCOHOL, POVIDONE 1 DROP: 14; 6 SOLUTION/ DROPS OPHTHALMIC at 17:49

## 2018-01-01 RX ADMIN — MULTIPLE VITAMIN LIQUID 5 ML: LIQUID at 08:27

## 2018-01-01 RX ADMIN — IPRATROPIUM BROMIDE 0.5 MG: 0.5 SOLUTION RESPIRATORY (INHALATION) at 06:57

## 2018-01-01 RX ADMIN — METHYLPREDNISOLONE SODIUM SUCCINATE 40 MG: 40 INJECTION, POWDER, FOR SOLUTION INTRAMUSCULAR; INTRAVENOUS at 20:35

## 2018-01-01 RX ADMIN — MULTIPLE VITAMINS W/ MINERALS TAB 1 TABLET: TAB ORAL at 08:13

## 2018-01-01 RX ADMIN — PANTOPRAZOLE SODIUM 40 MG: 40 INJECTION, POWDER, FOR SOLUTION INTRAVENOUS at 05:16

## 2018-01-01 RX ADMIN — ALBUTEROL SULFATE 2.5 MG: 2.5 SOLUTION RESPIRATORY (INHALATION) at 18:55

## 2018-01-01 RX ADMIN — MAGNESIUM GLUCONATE 500 MG ORAL TABLET 400 MG: 500 TABLET ORAL at 20:01

## 2018-01-01 RX ADMIN — Medication: at 06:23

## 2018-01-01 RX ADMIN — IPRATROPIUM BROMIDE AND ALBUTEROL SULFATE 3 ML: 2.5; .5 SOLUTION RESPIRATORY (INHALATION) at 12:07

## 2018-01-01 RX ADMIN — CASTOR OIL AND BALSAM, PERU: 788; 87 OINTMENT TOPICAL at 08:33

## 2018-01-01 RX ADMIN — MULTIPLE VITAMIN LIQUID 1 ML: LIQUID at 08:18

## 2018-01-01 RX ADMIN — MORPHINE SULFATE 10 MG: 10 INJECTION INTRAVENOUS at 21:59

## 2018-01-01 RX ADMIN — MIDAZOLAM HYDROCHLORIDE 2 MG: 1 INJECTION, SOLUTION INTRAMUSCULAR; INTRAVENOUS at 21:35

## 2018-01-01 RX ADMIN — Medication: at 08:35

## 2018-01-01 RX ADMIN — IPRATROPIUM BROMIDE 0.5 MG: 0.5 SOLUTION RESPIRATORY (INHALATION) at 19:22

## 2018-01-01 RX ADMIN — MAGNESIUM GLUCONATE 500 MG ORAL TABLET 400 MG: 500 TABLET ORAL at 20:16

## 2018-01-01 RX ADMIN — IOPAMIDOL 85 ML: 755 INJECTION, SOLUTION INTRAVENOUS at 19:24

## 2018-01-01 RX ADMIN — PIPERACILLIN SODIUM,TAZOBACTAM SODIUM 3.38 G: 3; .375 INJECTION, POWDER, FOR SOLUTION INTRAVENOUS at 23:00

## 2018-01-01 RX ADMIN — CASTOR OIL AND BALSAM, PERU: 788; 87 OINTMENT TOPICAL at 20:16

## 2018-01-01 RX ADMIN — Medication: at 11:27

## 2018-01-01 RX ADMIN — INSULIN LISPRO 3 UNITS: 100 INJECTION, SOLUTION INTRAVENOUS; SUBCUTANEOUS at 19:49

## 2018-01-01 RX ADMIN — NYSTATIN: 100000 POWDER TOPICAL at 20:16

## 2018-01-01 RX ADMIN — SODIUM CHLORIDE 100 ML/HR: 9 INJECTION, SOLUTION INTRAVENOUS at 20:08

## 2018-01-01 RX ADMIN — IPRATROPIUM BROMIDE 0.5 MG: 0.5 SOLUTION RESPIRATORY (INHALATION) at 13:02

## 2018-01-01 RX ADMIN — HEPARIN SODIUM 5000 UNITS: 5000 INJECTION, SOLUTION INTRAVENOUS; SUBCUTANEOUS at 08:10

## 2018-01-01 RX ADMIN — Medication 10 ML: at 08:52

## 2018-01-01 RX ADMIN — PANTOPRAZOLE SODIUM 40 MG: 40 INJECTION, POWDER, FOR SOLUTION INTRAVENOUS at 05:14

## 2018-01-01 RX ADMIN — Medication 10 ML: at 08:32

## 2018-01-01 RX ADMIN — Medication 10 ML: at 21:12

## 2018-01-01 RX ADMIN — SODIUM CHLORIDE 100 ML/HR: 9 INJECTION, SOLUTION INTRAVENOUS at 16:14

## 2018-01-01 RX ADMIN — VANCOMYCIN HYDROCHLORIDE 750 MG: 5 INJECTION, POWDER, LYOPHILIZED, FOR SOLUTION INTRAVENOUS at 04:00

## 2018-01-01 RX ADMIN — HEPARIN SODIUM 5000 UNITS: 5000 INJECTION, SOLUTION INTRAVENOUS; SUBCUTANEOUS at 20:08

## 2018-01-01 RX ADMIN — METHYLPREDNISOLONE SODIUM SUCCINATE 40 MG: 40 INJECTION, POWDER, FOR SOLUTION INTRAMUSCULAR; INTRAVENOUS at 08:30

## 2018-01-01 RX ADMIN — SODIUM CHLORIDE 100 ML/HR: 9 INJECTION, SOLUTION INTRAVENOUS at 01:23

## 2018-01-01 RX ADMIN — Medication 10 ML: at 08:18

## 2018-01-01 RX ADMIN — MAGNESIUM GLUCONATE 500 MG ORAL TABLET 400 MG: 500 TABLET ORAL at 09:25

## 2018-01-01 RX ADMIN — IPRATROPIUM BROMIDE 0.5 MG: 0.5 SOLUTION RESPIRATORY (INHALATION) at 18:30

## 2018-01-01 RX ADMIN — BUDESONIDE AND FORMOTEROL FUMARATE DIHYDRATE 2 PUFF: 80; 4.5 AEROSOL RESPIRATORY (INHALATION) at 09:10

## 2018-01-01 RX ADMIN — PROPOFOL 50 MCG/KG/MIN: 10 INJECTION, EMULSION INTRAVENOUS at 05:15

## 2018-01-01 RX ADMIN — DEXMEDETOMIDINE HYDROCHLORIDE 0.2 MCG/KG/HR: 100 INJECTION, SOLUTION INTRAVENOUS at 00:26

## 2018-01-01 RX ADMIN — INSULIN LISPRO 2 UNITS: 100 INJECTION, SOLUTION INTRAVENOUS; SUBCUTANEOUS at 11:51

## 2018-01-01 RX ADMIN — DOXYCYCLINE 100 MG: 100 INJECTION, POWDER, LYOPHILIZED, FOR SOLUTION INTRAVENOUS at 12:26

## 2018-01-01 RX ADMIN — Medication: at 13:42

## 2018-01-01 RX ADMIN — METHYLPREDNISOLONE SODIUM SUCCINATE 40 MG: 40 INJECTION, POWDER, FOR SOLUTION INTRAMUSCULAR; INTRAVENOUS at 20:08

## 2018-01-01 RX ADMIN — PROPOFOL 50 MCG/KG/MIN: 10 INJECTION, EMULSION INTRAVENOUS at 15:14

## 2018-01-01 RX ADMIN — DEXMEDETOMIDINE HYDROCHLORIDE 0.6 MCG/KG/HR: 100 INJECTION, SOLUTION INTRAVENOUS at 15:20

## 2018-01-01 RX ADMIN — DEXMEDETOMIDINE HYDROCHLORIDE 1.5 MCG/KG/HR: 100 INJECTION, SOLUTION INTRAVENOUS at 11:25

## 2018-01-01 RX ADMIN — PROPOFOL 50 MCG/KG/MIN: 10 INJECTION, EMULSION INTRAVENOUS at 23:57

## 2018-01-01 RX ADMIN — DEXMEDETOMIDINE HYDROCHLORIDE 1.5 MCG/KG/HR: 100 INJECTION, SOLUTION INTRAVENOUS at 16:15

## 2018-01-01 RX ADMIN — DEXMEDETOMIDINE HYDROCHLORIDE 1.5 MCG/KG/HR: 100 INJECTION, SOLUTION INTRAVENOUS at 06:22

## 2018-01-01 RX ADMIN — Medication 10 ML: at 20:35

## 2018-01-01 RX ADMIN — METHYLPREDNISOLONE SODIUM SUCCINATE 60 MG: 125 INJECTION, POWDER, FOR SOLUTION INTRAMUSCULAR; INTRAVENOUS at 09:25

## 2018-01-01 RX ADMIN — PIPERACILLIN SODIUM,TAZOBACTAM SODIUM 3.38 G: 3; .375 INJECTION, POWDER, FOR SOLUTION INTRAVENOUS at 05:53

## 2018-01-01 RX ADMIN — HEPARIN SODIUM 5000 UNITS: 5000 INJECTION, SOLUTION INTRAVENOUS; SUBCUTANEOUS at 08:30

## 2018-01-01 RX ADMIN — PROPOFOL 40 MCG/KG/MIN: 10 INJECTION, EMULSION INTRAVENOUS at 00:57

## 2018-01-01 RX ADMIN — LORAZEPAM 1 MG: 2 INJECTION, SOLUTION INTRAMUSCULAR; INTRAVENOUS at 08:04

## 2018-01-01 RX ADMIN — MORPHINE SULFATE 10 MG: 10 INJECTION INTRAVENOUS at 01:24

## 2018-01-01 RX ADMIN — CASTOR OIL AND BALSAM, PERU: 788; 87 OINTMENT TOPICAL at 21:26

## 2018-01-01 RX ADMIN — IPRATROPIUM BROMIDE 0.5 MG: 0.5 SOLUTION RESPIRATORY (INHALATION) at 19:23

## 2018-01-01 RX ADMIN — DEXMEDETOMIDINE HYDROCHLORIDE 1.2 MCG/KG/HR: 100 INJECTION, SOLUTION INTRAVENOUS at 08:27

## 2018-01-01 RX ADMIN — CHLORHEXIDINE GLUCONATE 15 ML: 1.2 RINSE ORAL at 08:33

## 2018-01-01 RX ADMIN — INSULIN LISPRO 2 UNITS: 100 INJECTION, SOLUTION INTRAVENOUS; SUBCUTANEOUS at 17:45

## 2018-01-01 RX ADMIN — POLYVINYL ALCOHOL, POVIDONE 1 DROP: 14; 6 SOLUTION/ DROPS OPHTHALMIC at 20:09

## 2018-01-01 RX ADMIN — PANTOPRAZOLE SODIUM 40 MG: 40 INJECTION, POWDER, FOR SOLUTION INTRAVENOUS at 05:19

## 2018-01-01 RX ADMIN — HEPARIN SODIUM 5000 UNITS: 5000 INJECTION, SOLUTION INTRAVENOUS; SUBCUTANEOUS at 08:58

## 2018-01-01 RX ADMIN — CASTOR OIL AND BALSAM, PERU: 788; 87 OINTMENT TOPICAL at 20:01

## 2018-01-01 RX ADMIN — CHLORHEXIDINE GLUCONATE 15 ML: 1.2 RINSE ORAL at 09:01

## 2018-01-01 RX ADMIN — MIDAZOLAM HYDROCHLORIDE 2 MG: 1 INJECTION, SOLUTION INTRAMUSCULAR; INTRAVENOUS at 15:01

## 2018-01-01 RX ADMIN — INSULIN LISPRO 2 UNITS: 100 INJECTION, SOLUTION INTRAVENOUS; SUBCUTANEOUS at 09:37

## 2018-01-01 RX ADMIN — METHYLPREDNISOLONE SODIUM SUCCINATE 60 MG: 125 INJECTION, POWDER, FOR SOLUTION INTRAMUSCULAR; INTRAVENOUS at 09:03

## 2018-01-01 RX ADMIN — DOXYCYCLINE 100 MG: 100 INJECTION, POWDER, LYOPHILIZED, FOR SOLUTION INTRAVENOUS at 12:51

## 2018-01-01 RX ADMIN — MIDAZOLAM HYDROCHLORIDE 2 MG: 1 INJECTION, SOLUTION INTRAMUSCULAR; INTRAVENOUS at 11:44

## 2018-01-01 RX ADMIN — LORAZEPAM 0.5 MG: 2 INJECTION, SOLUTION INTRAMUSCULAR; INTRAVENOUS at 06:56

## 2018-01-01 RX ADMIN — CHLORHEXIDINE GLUCONATE 15 ML: 1.2 RINSE ORAL at 08:19

## 2018-01-01 RX ADMIN — HYDRALAZINE HYDROCHLORIDE 10 MG: 20 INJECTION INTRAMUSCULAR; INTRAVENOUS at 08:03

## 2018-01-01 RX ADMIN — CHLORHEXIDINE GLUCONATE 15 ML: 1.2 RINSE ORAL at 08:34

## 2018-01-01 RX ADMIN — DEXMEDETOMIDINE HYDROCHLORIDE 1.5 MCG/KG/HR: 100 INJECTION, SOLUTION INTRAVENOUS at 11:02

## 2018-01-01 RX ADMIN — IPRATROPIUM BROMIDE AND ALBUTEROL SULFATE 3 ML: .5; 2.5 SOLUTION RESPIRATORY (INHALATION) at 09:19

## 2018-01-01 RX ADMIN — IPRATROPIUM BROMIDE 0.5 MG: 0.5 SOLUTION RESPIRATORY (INHALATION) at 12:56

## 2018-01-01 RX ADMIN — INSULIN ASPART 2 UNITS: 100 INJECTION, SOLUTION INTRAVENOUS; SUBCUTANEOUS at 17:51

## 2018-01-01 RX ADMIN — METHYLPREDNISOLONE SODIUM SUCCINATE 40 MG: 40 INJECTION, POWDER, FOR SOLUTION INTRAMUSCULAR; INTRAVENOUS at 08:33

## 2018-01-01 RX ADMIN — METOPROLOL TARTRATE 50 MG: 50 TABLET ORAL at 16:43

## 2018-01-01 RX ADMIN — FAMOTIDINE 40 MG: 20 TABLET, FILM COATED ORAL at 09:25

## 2018-01-01 RX ADMIN — PIPERACILLIN SODIUM,TAZOBACTAM SODIUM 3.38 G: 3; .375 INJECTION, POWDER, FOR SOLUTION INTRAVENOUS at 15:14

## 2018-01-01 RX ADMIN — LORAZEPAM 1 MG: 2 INJECTION, SOLUTION INTRAMUSCULAR; INTRAVENOUS at 20:05

## 2018-01-01 RX ADMIN — NYSTATIN 1 APPLICATION: 100000 POWDER TOPICAL at 08:53

## 2018-01-01 RX ADMIN — PIPERACILLIN SODIUM,TAZOBACTAM SODIUM 3.38 G: 3; .375 INJECTION, POWDER, FOR SOLUTION INTRAVENOUS at 14:55

## 2018-01-01 RX ADMIN — METHYLPREDNISOLONE SODIUM SUCCINATE 40 MG: 40 INJECTION, POWDER, FOR SOLUTION INTRAMUSCULAR; INTRAVENOUS at 20:59

## 2018-01-01 RX ADMIN — GLYCOPYRROLATE 0.4 MG: 0.2 INJECTION, SOLUTION INTRAMUSCULAR; INTRAVENOUS at 04:15

## 2018-01-01 RX ADMIN — IOPAMIDOL 50 ML: 755 INJECTION, SOLUTION INTRAVENOUS at 11:45

## 2018-01-01 RX ADMIN — MORPHINE SULFATE 4 MG: 4 INJECTION INTRAVENOUS at 17:50

## 2018-01-01 RX ADMIN — HEPARIN SODIUM 5000 UNITS: 5000 INJECTION, SOLUTION INTRAVENOUS; SUBCUTANEOUS at 08:18

## 2018-01-01 RX ADMIN — INSULIN ASPART 2 UNITS: 100 INJECTION, SOLUTION INTRAVENOUS; SUBCUTANEOUS at 06:45

## 2018-01-01 RX ADMIN — MAGNESIUM GLUCONATE 500 MG ORAL TABLET 400 MG: 500 TABLET ORAL at 20:59

## 2018-01-01 RX ADMIN — NYSTATIN: 100000 POWDER TOPICAL at 20:01

## 2018-01-01 RX ADMIN — CHLORHEXIDINE GLUCONATE 15 ML: 1.2 RINSE ORAL at 08:37

## 2018-01-01 RX ADMIN — SODIUM CHLORIDE 100 ML/HR: 9 INJECTION, SOLUTION INTRAVENOUS at 15:16

## 2018-01-01 RX ADMIN — IPRATROPIUM BROMIDE AND ALBUTEROL SULFATE 3 ML: 2.5; .5 SOLUTION RESPIRATORY (INHALATION) at 07:07

## 2018-01-01 RX ADMIN — Medication: at 21:23

## 2018-01-01 RX ADMIN — MULTIPLE VITAMIN LIQUID 5 ML: LIQUID at 09:07

## 2018-01-01 RX ADMIN — Medication 10 ML: at 08:56

## 2018-01-01 RX ADMIN — PANTOPRAZOLE SODIUM 40 MG: 40 INJECTION, POWDER, FOR SOLUTION INTRAVENOUS at 06:08

## 2018-01-01 RX ADMIN — PROPOFOL 35 MCG/KG/MIN: 10 INJECTION, EMULSION INTRAVENOUS at 21:17

## 2018-01-01 RX ADMIN — MULTIPLE VITAMIN LIQUID 5 ML: LIQUID at 08:48

## 2018-01-01 RX ADMIN — SODIUM CHLORIDE 100 ML/HR: 9 INJECTION, SOLUTION INTRAVENOUS at 02:04

## 2018-01-01 RX ADMIN — NYSTATIN: 100000 POWDER TOPICAL at 08:33

## 2018-01-01 RX ADMIN — IPRATROPIUM BROMIDE AND ALBUTEROL SULFATE 3 ML: 2.5; .5 SOLUTION RESPIRATORY (INHALATION) at 15:44

## 2018-01-01 RX ADMIN — NYSTATIN: 100000 POWDER TOPICAL at 20:59

## 2018-01-01 RX ADMIN — PROPOFOL 50 MCG/KG/MIN: 10 INJECTION, EMULSION INTRAVENOUS at 11:07

## 2018-01-01 RX ADMIN — DEXMEDETOMIDINE HYDROCHLORIDE 0.8 MCG/KG/HR: 100 INJECTION, SOLUTION INTRAVENOUS at 23:49

## 2018-01-01 RX ADMIN — DOXYCYCLINE 100 MG: 100 INJECTION, POWDER, LYOPHILIZED, FOR SOLUTION INTRAVENOUS at 13:42

## 2018-01-01 RX ADMIN — CASTOR OIL AND BALSAM, PERU: 788; 87 OINTMENT TOPICAL at 20:05

## 2018-01-01 RX ADMIN — Medication: at 01:00

## 2018-01-01 RX ADMIN — METHYLPREDNISOLONE SODIUM SUCCINATE 60 MG: 125 INJECTION, POWDER, FOR SOLUTION INTRAMUSCULAR; INTRAVENOUS at 22:27

## 2018-01-01 RX ADMIN — CASTOR OIL AND BALSAM, PERU: 788; 87 OINTMENT TOPICAL at 11:02

## 2018-01-01 RX ADMIN — Medication 5 ML: at 11:51

## 2018-01-01 RX ADMIN — LORAZEPAM 0.5 MG: 2 INJECTION, SOLUTION INTRAMUSCULAR; INTRAVENOUS at 20:06

## 2018-01-01 RX ADMIN — CASTOR OIL AND BALSAM, PERU: 788; 87 OINTMENT TOPICAL at 20:35

## 2018-01-01 RX ADMIN — MIDAZOLAM HYDROCHLORIDE 2 MG: 1 INJECTION, SOLUTION INTRAMUSCULAR; INTRAVENOUS at 03:50

## 2018-01-01 RX ADMIN — Medication 10 ML: at 08:38

## 2018-01-01 RX ADMIN — SODIUM PHOSPHATE, MONOBASIC, MONOHYDRATE 20 MMOL: 276; 142 INJECTION, SOLUTION INTRAVENOUS at 08:27

## 2018-01-01 RX ADMIN — MIDAZOLAM HYDROCHLORIDE 2 MG: 1 INJECTION, SOLUTION INTRAMUSCULAR; INTRAVENOUS at 13:42

## 2018-01-01 RX ADMIN — DEXMEDETOMIDINE HYDROCHLORIDE 1.4 MCG/KG/HR: 100 INJECTION, SOLUTION INTRAVENOUS at 06:00

## 2018-01-01 RX ADMIN — MULTIPLE VITAMIN LIQUID 5 ML: LIQUID at 17:25

## 2018-01-01 RX ADMIN — IPRATROPIUM BROMIDE 0.5 MG: 0.5 SOLUTION RESPIRATORY (INHALATION) at 13:37

## 2018-01-01 RX ADMIN — PIPERACILLIN SODIUM,TAZOBACTAM SODIUM 3.38 G: 3; .375 INJECTION, POWDER, FOR SOLUTION INTRAVENOUS at 14:26

## 2018-01-01 RX ADMIN — MAGNESIUM SULFATE IN DEXTROSE 1 G: 10 INJECTION, SOLUTION INTRAVENOUS at 08:27

## 2018-01-01 RX ADMIN — CASTOR OIL AND BALSAM, PERU: 788; 87 OINTMENT TOPICAL at 08:18

## 2018-01-01 RX ADMIN — DOXYCYCLINE 100 MG: 100 INJECTION, POWDER, LYOPHILIZED, FOR SOLUTION INTRAVENOUS at 12:18

## 2018-01-01 RX ADMIN — SODIUM CHLORIDE 100 ML/HR: 4.5 INJECTION, SOLUTION INTRAVENOUS at 00:27

## 2018-01-01 RX ADMIN — HEPARIN SODIUM 5000 UNITS: 5000 INJECTION, SOLUTION INTRAVENOUS; SUBCUTANEOUS at 09:06

## 2018-01-01 RX ADMIN — NYSTATIN: 100000 POWDER TOPICAL at 08:18

## 2018-01-01 RX ADMIN — DEXMEDETOMIDINE HYDROCHLORIDE 1.5 MCG/KG/HR: 100 INJECTION, SOLUTION INTRAVENOUS at 07:07

## 2018-01-01 RX ADMIN — PIPERACILLIN SODIUM,TAZOBACTAM SODIUM 3.38 G: 3; .375 INJECTION, POWDER, FOR SOLUTION INTRAVENOUS at 16:00

## 2018-01-01 RX ADMIN — DOXYCYCLINE 100 MG: 100 INJECTION, POWDER, LYOPHILIZED, FOR SOLUTION INTRAVENOUS at 12:48

## 2018-01-01 RX ADMIN — INSULIN ASPART 2 UNITS: 100 INJECTION, SOLUTION INTRAVENOUS; SUBCUTANEOUS at 12:25

## 2018-01-01 RX ADMIN — PIPERACILLIN SODIUM,TAZOBACTAM SODIUM 3.38 G: 3; .375 INJECTION, POWDER, FOR SOLUTION INTRAVENOUS at 15:08

## 2018-01-01 RX ADMIN — SODIUM CHLORIDE 1000 ML: 9 INJECTION, SOLUTION INTRAVENOUS at 09:21

## 2018-01-01 RX ADMIN — PIPERACILLIN SODIUM,TAZOBACTAM SODIUM 3.38 G: 3; .375 INJECTION, POWDER, FOR SOLUTION INTRAVENOUS at 23:58

## 2018-01-01 RX ADMIN — LORAZEPAM 1 MG: 2 INJECTION, SOLUTION INTRAMUSCULAR; INTRAVENOUS at 15:13

## 2018-01-01 RX ADMIN — HEPARIN SODIUM 5000 UNITS: 5000 INJECTION, SOLUTION INTRAVENOUS; SUBCUTANEOUS at 20:16

## 2018-01-01 RX ADMIN — PIPERACILLIN SODIUM,TAZOBACTAM SODIUM 3.38 G: 3; .375 INJECTION, POWDER, FOR SOLUTION INTRAVENOUS at 23:34

## 2018-01-01 RX ADMIN — PIPERACILLIN SODIUM,TAZOBACTAM SODIUM 3.38 G: 3; .375 INJECTION, POWDER, FOR SOLUTION INTRAVENOUS at 15:02

## 2018-01-01 RX ADMIN — Medication 10 ML: at 20:10

## 2018-01-01 RX ADMIN — HEPARIN SODIUM 5000 UNITS: 5000 INJECTION, SOLUTION INTRAVENOUS; SUBCUTANEOUS at 08:33

## 2018-01-01 RX ADMIN — NYSTATIN: 100000 POWDER TOPICAL at 21:26

## 2018-01-01 RX ADMIN — IPRATROPIUM BROMIDE 0.5 MG: 0.5 SOLUTION RESPIRATORY (INHALATION) at 12:53

## 2018-01-01 RX ADMIN — MORPHINE SULFATE 10 MG: 10 INJECTION INTRAVENOUS at 05:46

## 2018-01-01 RX ADMIN — HEPARIN SODIUM 5000 UNITS: 5000 INJECTION, SOLUTION INTRAVENOUS; SUBCUTANEOUS at 20:01

## 2018-01-01 RX ADMIN — IPRATROPIUM BROMIDE AND ALBUTEROL SULFATE 3 ML: .5; 3 SOLUTION RESPIRATORY (INHALATION) at 15:35

## 2018-01-01 RX ADMIN — IPRATROPIUM BROMIDE 0.5 MG: 0.5 SOLUTION RESPIRATORY (INHALATION) at 06:32

## 2018-01-01 RX ADMIN — VANCOMYCIN HYDROCHLORIDE 750 MG: 5 INJECTION, POWDER, LYOPHILIZED, FOR SOLUTION INTRAVENOUS at 20:08

## 2018-01-01 RX ADMIN — CHLORHEXIDINE GLUCONATE 15 ML: 1.2 RINSE ORAL at 20:59

## 2018-01-01 RX ADMIN — SODIUM CHLORIDE 100 ML/HR: 9 INJECTION, SOLUTION INTRAVENOUS at 08:25

## 2018-01-01 RX ADMIN — VANCOMYCIN HYDROCHLORIDE 1000 MG: 5 INJECTION, POWDER, LYOPHILIZED, FOR SOLUTION INTRAVENOUS at 09:57

## 2018-01-01 RX ADMIN — DEXMEDETOMIDINE HYDROCHLORIDE 1.5 MCG/KG/HR: 100 INJECTION, SOLUTION INTRAVENOUS at 22:42

## 2018-01-01 RX ADMIN — LORAZEPAM 2 MG: 2 INJECTION, SOLUTION INTRAMUSCULAR; INTRAVENOUS at 12:58

## 2018-01-01 RX ADMIN — POTASSIUM CHLORIDE 40 MEQ: 1.5 POWDER, FOR SOLUTION ORAL at 20:16

## 2018-01-01 RX ADMIN — PROPOFOL 50 MCG/KG/MIN: 10 INJECTION, EMULSION INTRAVENOUS at 16:42

## 2018-01-01 RX ADMIN — CHLORHEXIDINE GLUCONATE 15 ML: 1.2 RINSE ORAL at 20:01

## 2018-01-01 RX ADMIN — METOPROLOL TARTRATE 12.5 MG: 25 TABLET, FILM COATED ORAL at 08:11

## 2018-01-01 RX ADMIN — Medication: at 21:30

## 2018-01-01 RX ADMIN — SODIUM CHLORIDE 100 ML/HR: 9 INJECTION, SOLUTION INTRAVENOUS at 18:18

## 2018-01-01 RX ADMIN — HEPARIN SODIUM 5000 UNITS: 5000 INJECTION, SOLUTION INTRAVENOUS; SUBCUTANEOUS at 20:59

## 2018-02-08 NOTE — ED NOTES
Pt was returned from ct and I was informed by franklyn from ct that the pt had infiltrated 20-30ml of contrast and saline in the right ac area.  I immediately entered the room pt was not c/o pain at this time.  Pt did have a raised area above the iv site in the right ac.  I informed lead nurse christy ayala and the extravasation protocol was followed.  Iv site removed, arm elevated, warm compress applied to affected area for 1 hour.  Pt is noted to have some reddened area directly above iv site md made aware will ctm     Mary Oliveira, RN  02/08/18 [id number]

## 2018-02-15 NOTE — ED PROVIDER NOTES
Subjective   Patient is a 72 y.o. female presenting with shortness of breath.   Shortness of Breath   Severity:  Moderate  Progression:  Worsening  Chronicity:  New  Context: activity    Relieved by:  Nothing  Worsened by:  Exertion and movement  Associated symptoms: fever and wheezing    Associated symptoms: no abdominal pain and no chest pain        Review of Systems   Constitutional: Positive for fever.   HENT: Negative.    Respiratory: Positive for shortness of breath and wheezing.    Cardiovascular: Negative.  Negative for chest pain.   Gastrointestinal: Negative.  Negative for abdominal pain.   Endocrine: Negative.    Genitourinary: Negative.  Negative for dysuria.   Skin: Negative.    Neurological: Negative.    Psychiatric/Behavioral: Negative.    All other systems reviewed and are negative.      History reviewed. No pertinent past medical history.    No Known Allergies    History reviewed. No pertinent surgical history.    History reviewed. No pertinent family history.    Social History     Social History   • Marital status:      Spouse name: N/A   • Number of children: N/A   • Years of education: N/A     Social History Main Topics   • Smoking status: Unknown If Ever Smoked   • Smokeless tobacco: None   • Alcohol use None   • Drug use: None   • Sexual activity: Not Asked     Other Topics Concern   • None     Social History Narrative   • None           Objective   Physical Exam   Constitutional: She is oriented to person, place, and time. She appears well-developed and well-nourished. No distress.   HENT:   Head: Normocephalic and atraumatic.   Right Ear: External ear normal.   Left Ear: External ear normal.   Nose: Nose normal.   Eyes: Conjunctivae and EOM are normal. Pupils are equal, round, and reactive to light.   Neck: Normal range of motion. Neck supple. No JVD present. No tracheal deviation present.   Cardiovascular: Normal rate, regular rhythm and normal heart sounds.    No murmur  heard.  Pulmonary/Chest: Effort normal and breath sounds normal. No respiratory distress. She has no wheezes.   Abdominal: Soft. Bowel sounds are normal. There is no tenderness.   Musculoskeletal: Normal range of motion. She exhibits no edema or deformity.   Neurological: She is alert and oriented to person, place, and time. No cranial nerve deficit.   Skin: Skin is warm and dry. No rash noted. She is not diaphoretic. No erythema. No pallor.   Psychiatric: She has a normal mood and affect. Her behavior is normal. Thought content normal.   Nursing note and vitals reviewed.      Procedures         ED Course  ED Course                  MDM    Final diagnoses:   Acute bacterial bronchitis            Trip Garcia MD  02/14/18 0872

## 2018-02-28 PROBLEM — J98.59 MEDIASTINAL MASS: Status: ACTIVE | Noted: 2018-01-01

## 2018-03-08 PROBLEM — J44.1 CHRONIC OBSTRUCTIVE PULMONARY DISEASE WITH ACUTE EXACERBATION (HCC): Status: ACTIVE | Noted: 2018-01-01

## 2018-03-08 PROBLEM — E43 SEVERE MALNUTRITION (HCC): Status: ACTIVE | Noted: 2018-01-01

## 2018-03-08 PROBLEM — I45.81: Status: ACTIVE | Noted: 2018-01-01

## 2018-03-08 PROBLEM — E11.9 T2DM (TYPE 2 DIABETES MELLITUS) (HCC): Status: ACTIVE | Noted: 2018-01-01

## 2018-03-08 PROBLEM — E86.9 VOLUME DEPLETION: Status: ACTIVE | Noted: 2018-01-01

## 2018-03-08 PROBLEM — F41.9 ANXIETY: Status: ACTIVE | Noted: 2018-01-01

## 2018-03-08 PROBLEM — J96.01 ACUTE RESPIRATORY FAILURE WITH HYPOXIA (HCC): Status: ACTIVE | Noted: 2018-01-01

## 2018-03-08 PROBLEM — R63.4 UNINTENTIONAL WEIGHT LOSS: Status: ACTIVE | Noted: 2018-01-01

## 2018-03-08 PROBLEM — R68.89 SUSPECTED DEEP TISSUE INJURY: Status: ACTIVE | Noted: 2018-01-01

## 2018-03-08 NOTE — PAT
Patient still being monitored in [facility] as haven't heard any response from hospitalist about possible admitting patient today or what peterson govea) for lex wants to do, peterson paged again at this time, but patient still not able to be off oxygen and sustain ra for proper pulseoxy reading.

## 2018-03-08 NOTE — PAT
Report given to nurse receiving patient (gisele steven) to room 461 S. No new orders at this time. Patient being monitored in pat until room is ready then will be transferred to floor by transport.

## 2018-03-08 NOTE — CONSULTS
[facility] Cardiology Consult    03/08/2018     Subjective:      Brandy Amado  S461/1  1945  0    Koko Melchor MD    Chief Complaint: Abnormal EKG    Problem List:  1. Abnormal EKG  2. Hypertension  3. Anxiety  4. 6 cm Mass of inferior posterior mediastinum with some compression effect on thoracic aorta  5. Acute Respiratory Failure with Hypoxia  6. COPD  7. History of Tobacco with cessation 18 months ago  8. Psoriasis  9. Unintentional Weight Loss  10. Elevated HGBA1c- 6.2  11. Suspected deep tissue injury right lateral thigh- POA        [START ON 3/9/2018] famotidine 40 mg Oral Daily   heparin (porcine) 5,000 Units Subcutaneous Q12H   insulin lispro 0-7 Units Subcutaneous 4x Daily With Meals & Nightly   ipratropium-albuterol 3 mL Nebulization 4x Daily - RT   magic mouthwash 5 mL Swish & Spit Q6H   metoprolol tartrate 50 mg Oral Q12H   [START ON 3/9/2018] multivitamin with minerals 1 tablet Oral Daily   nicotine 1 patch Transdermal Q24H   [START ON 3/9/2018] predniSONE 40 mg Oral Daily With Breakfast       sodium chloride 100 mL/hr     has No Known Allergies.    HPI: Brandy Amado is a 72 y.o. female with PMH of HTN, COPD and long history of smoking, still using E-cigarette, anxiety/depression, 17lb unintentional wt loss x 4mos.  Review of records shows was seen 2/8/18 in ED for Dyspnea, treated for acute bronchitis. CT of chest order 2/8/18 showed 6cm mass of inferior posterior mediastinum with compression effect on thoracic aorta referred to CTS, Dr Etienne for eval, planning bronch with bx.    Pt arrived at preop today and was noted to have significant dyspnea, hypoxic satting 81% on RA, placed on 3L NC improved to 93%, being admitted to hospitalist service for further w/u and management.    EKG performed revealed a prolonged QTC per computer interpretation.  Cardiology has been asked to see for further evaluation.    Mrs. Amado's daughter states that her mom's condition has  deteriorated over the last 6-8 months due to increasing dyspnea and weakness.  She denies any symptoms of chest pain, palpitations, PND, othopnea or syncope. She has occasional dependent edema. Her activity level is very limited. She has had no previous cardiac evaluation.     Cardiac risk factors: advanced age (older than 55 for men, 65 for women), diabetes mellitus, hypertension, sedentary lifestyle and smoking/ tobacco exposure.     History  Family History   Problem Relation Age of Onset   • Stroke Mother    • Heart attack Father    • COPD Sister    • Lung cancer Brother    • No Known Problems Daughter    • No Known Problems Son      Past Surgical History:   Procedure Laterality Date   • VAGINAL DELIVERY      x4      Past Medical History:   Diagnosis Date   • Anxiety    • Depression    • Emphysema lung    • Fall     x2 within last 6 months and thinks medication related (xanax)    • [place] (hard of hearing)    • Hypertension      History   Smoking Status   • Former Smoker   • Packs/day: 1.50   • Years: 48.00   • Types: Cigarettes   • Quit date: 2/28/2017   Smokeless Tobacco   • Never Used     Comment: uses E-cigarette now     History   Alcohol Use No     Past Surgical History:   Procedure Laterality Date   • VAGINAL DELIVERY      x4       Review of Systems  Review of Systems   Constitution: Positive for decreased appetite, weakness, malaise/fatigue and weight loss.   HENT: Negative.    Eyes: Negative.    Cardiovascular: Positive for dyspnea on exertion and leg swelling. Negative for chest pain, irregular heartbeat, near-syncope, orthopnea, palpitations, paroxysmal nocturnal dyspnea and syncope.   Respiratory: Positive for shortness of breath. Negative for cough and hemoptysis.    Endocrine: Negative.    Hematologic/Lymphatic: Bruises/bleeds easily.   Skin: Positive for poor wound healing.        Psoriasis    Right upper thigh contusion   Musculoskeletal: Positive for arthritis.   Gastrointestinal: Positive for  diarrhea.   Genitourinary: Negative.    Psychiatric/Behavioral: The patient is nervous/anxious.    Allergic/Immunologic: Negative.      Objective:     weight is 54.1 kg (119 lb 3.2 oz). Her oral temperature is 98.3 °F (36.8 °C). Her blood pressure is 119/87 and her pulse is 109. Her respiration is 16 and oxygen saturation is 95%.     Physical Exam   Constitutional: She is oriented to person, place, and time. She appears well-developed.   HENT:   Head: Normocephalic and atraumatic.   Dental caries   Neck: Normal range of motion. No thyromegaly present.   Cardiovascular: Normal rate and regular rhythm.  Exam reveals no gallop and no friction rub.    No murmur heard.  Pulmonary/Chest: Effort normal. No respiratory distress. She has wheezes.   Diminished bilaterally   Abdominal: Soft. Bowel sounds are normal.   Musculoskeletal: Normal range of motion. She exhibits no edema.   Neurological: She is alert and oriented to person, place, and time.   Skin: Skin is warm and dry. Rash noted. There is pallor.   Right lateral thigh contusion   Psychiatric: She has a normal mood and affect. Her behavior is normal. Judgment and thought content normal.   Vitals reviewed.      Cardiographics  ECG: Sinus Tachycardia 108 bpm- nonspecific TWA .  Echocardiogram: pending    Imaging  Chest x-ray: patchy ill-defined opacification left lung base with small left pleural effusion. Cardiomegaly, underllying chronic emphysematous changes bilaterally.     Lab Review   Lab Results   Component Value Date    GLUCOSE 181 (H) 03/08/2018    BUN 15 03/08/2018    CREATININE 1.00 03/08/2018    EGFRIFNONA 55 (L) 03/08/2018    BCR 15.0 03/08/2018    CO2 35.0 (H) 03/08/2018    CALCIUM 8.7 03/08/2018    ALBUMIN 3.30 03/08/2018    LABIL2 1.2 (L) 03/08/2018    AST 54 (H) 03/08/2018    ALT 88 (H) 03/08/2018     Lab Results   Component Value Date    WBC 9.92 03/08/2018    HGB 13.7 03/08/2018    HCT 43.0 03/08/2018    MCV 99.5 (H) 03/08/2018     03/08/2018           BNP- 106, Magnesium 2.2            Assessment:    72 yr old white female with a history of Hypertension, COPD, tobacco abuse, Anxiety, psoriasis, who presented to pre-op today for bronchoscopy ( 6cm mediastinal mass) and was found to have acute respiratory failure.  EKG revealed prolonged QTC.  Patient denies chest pain, palpitations, orthopnea, PND or syncope. No prior cardiac history.      Plan:   1. Patient is an acceptable surgical risk based on her EKG findings.  2. Review Echocardiogram  3. Monitor on Telemetry.        Scribed for Koko Banks MD by Neetu Pitt RN. 3/8/2018  5:47 PM     I,Koko Banks M.D., personally performed the services described in this documentation as scribed by the above named individual in my presence, and it is both accurate and complete. No arrhythmia on telemetry.  Transthoracic echo revealed normal myocardial structure and function.  QT prolongation potentially secondary to fluoxetine.  Would taper down instead of completely discontinuing, have restarted at 20 mg by mouth daily.  We'll trend QT and monitor for arrhythmia on telemetry.  Continue low-dose beta-blockade.  Overall low risk for sustained ventricular arrhythmia.  3/9/2018  8:25 AM

## 2018-03-08 NOTE — H&P
[facility] Medicine Services  HISTORY AND PHYSICAL    Patient Name: Brandy Amado  : 1945  MRN: [id number]  Primary Care Physician: Koko Melchor MD    Subjective   Subjective     Chief Complaint:  SOA    HPI:  Brandy Amado is a 72 y.o. female with PMH of HTN, COPD and long history of smoking, still using E-cigarette, anxiety/depression, 17lb unintentional wt loss x 4mos.  Review of records shows was seen 18 in ED for Dyspnea, treated for acute bronchitis. CT of chest order 18 showed 6cm mass of inferior posterior mediastinum with compression effect on thoracic aorta referred to CTS, Dr Etienne for eval, planning bronch with bx    Pt arrived at preop today and noted to have significant dyspnea, hypoxic satting 81% on RA, placed on 3L NC improved to 93%, being admitted to hospitalist service for further w/u and management.    The patient is a fair historian, daughter present.  Patient reports progressive dyspnea over the several weeks, significantly worsened over the past few days.  Reports significant dyspnea with minimal exertion, as well as dyspnea at rest, no PND.  Does not use supplemental O2 at home.  Over the past few days has been having to use rescue inhaler frequently with minimal improvement.  Some lightheadedness, weakness with activity.  Recent falls noted per daughter.  Poor po, decreased fluid intake and dry mouth, takes Ensure TID.  Patient denies cardiac history, takes metoprolol, Lasix, Vasotec reportedly for hypertension, however stopped taking all these medications one week ago after found to be hypotensive at CT clinic eval, SBP 70s.  Patient with a history of anxiety takes Xanax twice a day.  Denies nightsweats, chest pain, palpitations, N/V.         Wbc normal, , CO2 35, AST 54, ALT 88, A1c 6.8%. EKG showed sinus tachy with QTc 552.     Review of Systems   Constitutional: Positive for activity change, appetite change, fatigue and  unexpected weight change. Negative for chills and fever.   HENT: Negative for rhinorrhea and sore throat.    Eyes: Negative.    Respiratory: Positive for shortness of breath and wheezing.    Cardiovascular: Negative for chest pain, palpitations and leg swelling.   Gastrointestinal: Negative for nausea and vomiting.   Endocrine: Negative for polydipsia and polyuria.   Genitourinary: Positive for decreased urine volume. Negative for difficulty urinating and dysuria.   Musculoskeletal: Positive for gait problem. Negative for myalgias.   Skin: Positive for wound (right thigh). Negative for rash.   Neurological: Positive for dizziness and weakness. Negative for syncope.   Hematological: Negative for adenopathy. Does not bruise/bleed easily.   Psychiatric/Behavioral: Positive for confusion (occasionally with visual hallucinations).        Anxiety        Otherwise 10-system ROS reviewed and is negative except as mentioned in the HPI.    Personal History     Past Medical History:   Diagnosis Date   • Anxiety    • Depression    • Emphysema lung    • Fall     x2 within last 6 months and thinks medication related (xanax)    • [place] (hard of hearing)    • Hypertension        Past Surgical History:   Procedure Laterality Date   • VAGINAL DELIVERY      x4   No prior surgeries    Family History: family history includes COPD in her sister; Heart attack in her father; Lung cancer in her brother; No Known Problems in her daughter and son; Stroke in her mother.     Social History:  reports that she quit smoking about a year ago. Her smoking use included Cigarettes. She has a 72.00 pack-year smoking history. She has never used smokeless tobacco. She reports that she does not drink alcohol or use illicit drugs.  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   3/8/2018 at Unknown time   • ALPRAZolam (XANAX) 1 MG tablet Take 1 mg by mouth 2 (Two) Times a Day As Needed for Anxiety.   3/8/2018 at Unknown time   • amLODIPine (NORVASC) 10 MG tablet Take 10 mg by mouth Daily.   Past Week at Unknown time   • enalapril (VASOTEC) 10 MG tablet Take 10 mg by mouth 2 (Two) Times a Day.   Past Week at Unknown time   • FLUoxetine (PROzac) 20 MG capsule Take 60 mg by mouth Daily.   3/7/2018 at Unknown time   • furosemide (LASIX) 20 MG tablet Take 20 mg by mouth Daily.   Past Week at Unknown time   • metoprolol tartrate (LOPRESSOR) 50 MG tablet Take 25 mg by mouth 2 (Two) Times a Day.   Past Week at Unknown time   • calcipotriene (DOVONEX) 0.005 % cream Apply 1 application topically As Needed.   Unknown at Unknown time       No Known Allergies    Objective   Objective     Vital Signs:   Temp:  [98.3 °F (36.8 °C)] 98.3 °F (36.8 °C)  Heart Rate:  [101-118] 109  Resp:  [16] 16  BP: (100-134)/(82-88) 119/87        Physical Exam   Constitutional: ill appearing cachetic female awake, alert in moderate resp distress  Eyes: PERRLA, sclerae anicteric, no conjunctival injection  HENT: NCAT, mucous membranes dry  Neck: Supple, trachea midline  Respiratory: tachypneic, diminished throughout with diffuse expiratory wheezes, sattting 90%on 3L   Cardiovascular: tachy and slightly irregular, no murmurs, rubs, or gallops, palpable pedal pulses bilaterally  Gastrointestinal: Positive bowel sounds, soft, nontender, nondistended  Musculoskeletal: No bilateral ankle edema, no clubbing or cyanosis to extremities  Psychiatric: anxious affect, cooperative  Neurologic: [place], Oriented x 3, strength symmetric in all extremities, Cranial Nerves II-XII  grossly intact to confrontation, speech clear  Skin: ~10cm area of DTI to right lateral thigh w/o ulceration. No rashes  Results Reviewed:  I have personally reviewed  current lab, radiology, and data and agree.      Results from last 7 days  Lab Units 03/08/18  0918   WBC 10*3/mm3 9.92   HEMOGLOBIN g/dL 13.7   HEMATOCRIT % 43.0   PLATELETS 10*3/mm3 334   INR  0.99       Results from last 7 days  Lab Units 03/08/18  1530 03/08/18  0918   SODIUM mmol/L  --  139   POTASSIUM mmol/L  --  4.1   CHLORIDE mmol/L  --  98*   CO2 mmol/L  --  35.0*   BUN mg/dL  --  15   CREATININE mg/dL  --  1.00   GLUCOSE mg/dL  --  181*   CALCIUM mg/dL  --  8.7   ALT (SGPT) U/L  --  88*   AST (SGOT) U/L  --  54*   TROPONIN I ng/mL 0.028 0.032     Estimated Creatinine Clearance: 44.1 mL/min (by C-G formula based on Cr of 1).  Brief Urine Lab Results     None        BNP   Date Value Ref Range Status   03/08/2018 106.0 (H) 0.0 - 100.0 pg/mL Final     Comment:     Results may be falsely decreased if patient taking Biotin.     No results found for: PHART  Imaging Results (last 24 hours)     Procedure Component Value Units Date/Time    XR Chest 1 View [[id number]] Collected:  03/08/18 1645     Updated:  03/08/18 1645    Narrative:       EXAMINATION: XR CHEST 1 VW-03/08/2018:      INDICATION: SOA.      COMPARISON: NONE.     FINDINGS: Portable chest reveals patchy ill-defined opacification left  lung base with small left pleural effusion. The heart is borderline  enlarged. Underlying chronic and emphysematous changes seen diffusely  throughout the lung fields.           Impression:       Patchy ill-defined opacification left lung base with small  left pleural effusion. The heart is enlarged. Underlying chronic and  emphysematous changes seen within lung fields bilaterally.     D:  03/08/2018  E:  03/08/2018                      Assessment/Plan   Assessment / Plan     Hospital Problem List     6cm mass of inferior posterior mediastinum with some compression effect on thoracic aorta     Acute respiratory failure with hypoxia    Anxiety    Unintentional weight loss    Severe malnutrition    Chronic obstructive  pulmonary disease with acute exacerbation    Congenital QT prolongation on electrocardiogram (ECG)    Volume depletion    Suspected deep tissue injury right lateral thigh POA    T2DM (type 2 diabetes mellitus)            Assessment & Plan:    Acute hypoxic resp failure/COPD EXAC  -currently on 3L NC maintaining low 90s  -2/8/18 CT of chest showed 6cm mediastinal mass with some compression of aorta  -ordered stat CTA of chest. NO PE, CONCERN, MEDIASTINAL MASS ENHANCED ON DELAYED IMAGES, CONCERN FOR POSS LEAKAGE FROM AORTA; DR ETIENNE MADE AWARE  -Adding on CT of abd and pelvis with and without as previously schedule for outpt today.   -lactate and BNP normal, reviewed CXR chronic changes noted no infiltrates or effusion  -ECHO ordered  -STAT nebs, METHYLprednisoLONE 60mg BID      6cm Mediastinal mass  -Consult CTS, Dr Etienne planning bronch with bx    Tachycardic, noted QT prolongation on EKG this a.m.   -EKG Qtc 552 now 602, will repeat EKG in 1 hr  -home metroprolol with holding parameter for hypotension  -hold SSRI  -K+, Mg, TSH, and troponin x2 normal   -Cardiology consult for preop eval  CONCERNED FOR POSS NEW ONSET AFIB/POSS PROLONGED QTC. CARD EVALUATED, CLEARED FOR SURGERY TOMORROW.    HTN, recent hypotension  -continue to hold norvasc, enalapril and lasix    Anxiety/depression  -continue home xanax, holding SSRI 2nd to QTc    New dx T2DM  -A1c 6.8%  -SSI, DM educator  -check TSH    Weakness, recent fall  -fall precautions  -PT tomorrow    Decreased UOP  -bladder scan, reports only voiding once today    Suspected DTI to right lateral thigh POA  -WOC consult    Nicotine dependence  -nictone patch.     Severe malnutrition  nutrition consult  DVT prophylaxis:heparin sq  q12hr for now (pending findings on CTA and EKG, may hold in a.m. For bronch)    CODE STATUS:  Full Code        Brief Attending Admission Attestation     I have seen and examined the patient, performing an independent face-to-face diagnostic  evaluation with plan of care reviewed and developed with the advanced practice clinician (APC).      Brief Summary Statement/HPI:   Brandy Amado is a 72 y.o. female with history of mediastinal mass that is compressing on the aorta, being followed by Dr. Etienne and was scheduled to half an elective surgery tomorrow.  She also has a history of heavy tobacco abuse, COPD/emphysema, hypertension, and newly diagnosed diabetes type 2. Patient presented for preop evaluation today and was found to be hypoxic, oxygen saturation as low as 80% on room air, so hospitalist was asked to admit for further evaluation.  EKG also showed sinus tachycardia, quality was poor, concern for possible new onset A. fib.  QT C was also prolonged.      Attending Physical Exam:  General Assessment: No acute cardiopulmonary distress.  Frail/thin    HEENT: NCAT, PERRL, MM MOIST    Neck: Supple, no carotid bruit bilat    CVS: RRR, S1S2 normal, no murmurs    Resp: Very diminished airflow bilaterally, no crackles, no wheezing, respiration is moderately labored, oxygen saturation 97% on 4 L at the moment     Abd: soft, NT, ND, normal BS, no guarding or peritoneal signs    Ext: No edema, both calves are symmetric and NTTP    Neuro: No A show asymmetry, speech clear    Skin: W/D/I. No rash.    Psych: Affect is appropriate    Brief Assessment/Plan :  See above for further detailed assessment and plan developed with APC which I have reviewed and/or edited.      Electronically signed by Jazlyn Parker MD, 03/08/18, 8:52 PM.             Admission Status:  I believe this patient meets INPATIENT status due to the need for care which can only be reasonably provided in an hospital setting such as aggressive/expedited ancillary services and/or consultation services, the necessity for IV medications, close physician monitoring and/or the possible need for procedures.  In such, I feel patient’s risk for adverse outcomes and need for care warrant INPATIENT  evaluation and predict the patient’s care encounter to likely last beyond 2 midnights.      Electronically signed by Casie M Mayne, PA-C, 03/08/18, 2:52 PM.

## 2018-03-08 NOTE — PAT
Celso the wound nurse coming to evaluate wound and properly dress since when daughter went to remove Band-Aid the skin came off with band aid and bleeding started to occur.  Bleeding stopped at this time but will let wound evaluate and properly dress since unsure what type of wound patient has.

## 2018-03-08 NOTE — PAT
Notified Gregorio Dukes of patient arriving to PAT SOB and 02 status and pressure ulcer on hip.  Gregorio will speak with Dr. Etienne and call PAT back.

## 2018-03-08 NOTE — PAT
Patient arrived to [facility] very sob, her daughter states patient suffers from severe anxiety and copd.   First pulseox reading was 81% and placed on oxygen 3L. After patient calmed down a bit after receiving o2 and being calmed down by nurses and reading was 90%.    3rd reading done is 93% on 3L.  Still monitoring at this time.

## 2018-03-08 NOTE — PAT
WOUND NURSE SAW PATIENT AND BELIEVES WOUND ISNT PRESSURE RELATED AND THINKS PSORIASIS MORE WITH SMALL SKIN TEAR AND UNSURE WHY BLISTER FORMED.  DRESSED AREA AND WILL FOLLOW UP WITH PATIENT.

## 2018-03-08 NOTE — PAT
ervin notified of possible needs after discharge. ervin to follow up with patient and family after surgery tomorrow or before if patient gets admitted.

## 2018-03-08 NOTE — PLAN OF CARE
Problem: Patient Care Overview (Adult)  Goal: Plan of Care Review  Outcome: Ongoing (interventions implemented as appropriate)    Goal: Adult Individualization and Mutuality  Outcome: Ongoing (interventions implemented as appropriate)      Problem: Anxiety (Adult)  Goal: Identify Related Risk Factors and Signs and Symptoms  Outcome: Ongoing (interventions implemented as appropriate)    Goal: Reduction/Resolution  Outcome: Ongoing (interventions implemented as appropriate)

## 2018-03-09 PROBLEM — I45.81: Status: RESOLVED | Noted: 2018-01-01 | Resolved: 2018-01-01

## 2018-03-09 NOTE — PROGRESS NOTES
Malnutrition Severity Assessment    Patient Name:  Brandy Amado  YOB: 1945  MRN: [id number]  Admit Date:  3/8/2018    Patient meets criteria for : Severe malnutrition    Comments:  MD, PT MEETS CRITERIA FOR SEVERE, CHRONIC MALNUTRITION. PLEASE ATTEST & INCLUDE IN DX AS APPROPRIATE.     Malnutrition Type: Chronic Illness Malnutrition     Malnutrition Type (last 8 hours)      Malnutrition Severity Assessment       03/09/18 1029    Malnutrition Severity Assessment    Malnutrition Type Chronic Illness Malnutrition      03/09/18 1029    Physical Signs of Malnutrition (Chronic)    Muscle Wasting --   MODERATE    Fat Loss --   MODERATE      03/09/18 1029    Weight Status (Chronic)    Weight Loss Severe (>10% / 6 mo)      03/09/18 1029    Energy Intake Status (Chronic)    Energy Intake Severe (< or equal to 50% / > or equal to 1 mo)      03/09/18 1029    Criteria Met (Must meet criteria for severity in at least 2 of these categories: M Wasting, Fat Loss, Fluid, Secondary Signs, Wt. Status, Intake)    Patient meets criteria for  Severe malnutrition          Electronically signed by:  Day Leroy RD  03/09/18 10:55 AM

## 2018-03-09 NOTE — PROGRESS NOTES
Continued Stay Note  [facility]     Patient Name: Brandy Amado  MRN: [id number]  Today's Date: 3/9/2018    Admit Date: 3/8/2018          Discharge Plan       03/09/18 1501    Case Management/Social Work Plan    Plan Social work provided the patients family with the information for [employer] [phone] that offers funding and assistance for people needing a hearing aide.    Patient/Family In Agreement With Plan yes      03/09/18 1336    Case Management/Social Work Plan    Plan Return home with spouse and home health    Patient/Family In Agreement With Plan yes    Additional Comments Attempted to meet with patient at bedside, but she was out of the room for a procedure. Gathered initial discharge planning information from patient's daughter, Yvonne Juarez.               Discharge Codes     None        Expected Discharge Date and Time     Expected Discharge Date Expected Discharge Time    Mar 13, 2018             VARSHA De León

## 2018-03-09 NOTE — PROGRESS NOTES
"Adult Nutrition  Assessment/PES    Patient Name:  Brandy Amado  YOB: 1945  MRN: [id number]  Admit Date:  3/8/2018    Assessment Date:  3/9/2018    Comments:            Reason for Assessment       03/09/18 1017    Reason for Assessment    Reason For Assessment/Visit identified at risk by screening criteria    Identified At Risk By Screening Criteria MST SCORE 2+    Time Spent (min) 45    Diagnosis Diagnosis    Cardiac HTN    Endocrine DM Type 2    Fluid Status Volume depletion    Psychosocial Other (comment)   HX OF ANXIETY, DEPRESSION    Pulmonary/Critical Care Acute respiratory failure;Hypoxemia;Other (comment)   EXACERBATION COPD    Skin Other (comment)   R LATERAL THIGH SKIN TEAR, PSORIASIS    Substance Use Other (comment)   FORMER TOBACCO ABUSE, CURRENTLY E-CIGS    Other diagnosis MEDIASTINAL MASS WITH SOME COMPRESSION EFFECT THORACIC AORTA ,WEAKNESS & FALLS, [place]              Nutrition/Diet History       03/09/18 1021    Nutrition/Diet History    Factors Affecting Nutritional Intake Factors    Reported/Observed By Family;Other   SON-IN-LAW REPORTS PT HAS LOST WT SINCE NOV IN WHICH PT WEIGHED 130.4# @ MD OFFICE. HE REPORTS PT'S APPETITE & INTAKE OF FOOD HAS BEEN < 50% OF USUAL FOR > 1 MO. DUE TO BREATHING ISSUES. HE PROVIDED FOOD PREFS/ TOLERANCES & REPORTS PT DRINKS ENSURE WELL             Anthropometrics       03/09/18 1026    Anthropometrics    Height 157.5 cm (62\")    Weight Method Standing scale    RD Documented Weight on Admission 54.1 kg (119 lb 3.2 oz)   WEIGHT MAY BE SKEWED BY FLUIDWEIGHT IN MD OFFICE 2/28 113.6#    Ideal Body Weight (IBW)    Ideal Body Weight (IBW), Female 50.83    Usual Body Weight (UBW)    Usual Body Weight 59.1 kg (130 lb 6.4 oz)    Weight Loss 7.62 kg (16 lb 12.8 oz)    % Weight Loss  13 %    Weight Loss Time Frame 4 MONTHS            Labs/Tests/Procedures/Meds       03/09/18 1028    Labs/Tests/Procedures/Meds    Labs/Tests Review Reviewed    Medication Review " Reviewed, pertinent;Steroid            Physical Findings       03/09/18 1029    Physical Findings/Assessment    Additional Documentation Physical Appearance (Group)    Physical Appearance    Gastrointestinal other (see comments)   WDL PER NURSING DOCUMENTATION              Nutrition Prescription Ordered       03/09/18 1029    Nutrition Prescription PO    Current PO Diet NPO                Malnutrition Severity Assessment       03/09/18 1029    Malnutrition Severity Assessment    Malnutrition Type Chronic Illness Malnutrition    Physical Signs of Malnutrition (Chronic)    Muscle Wasting --   MODERATE    Fat Loss --   MODERATE    Weight Status (Chronic)    Weight Loss Severe (>10% / 6 mo)    Energy Intake Status (Chronic)    Energy Intake Severe (< or equal to 50% / > or equal to 1 mo)    Criteria Met (Must meet criteria for severity in at least 2 of these categories: M Wasting, Fat Loss, Fluid, Secondary Signs, Wt. Status, Intake)    Patient meets criteria for  Severe malnutrition        Problem/Interventions:        Problem 1       03/09/18 1043    Nutrition Diagnoses Problem 1    Problem 1 Malnutrition   SEVERE, CHRONIC    Etiology (related to) Other (comment)   CLINICAL CONDITION    Signs/Symptoms (evidenced by) Report of Mnimal PO Intake;Unintended Weight Change    Unintended Weight Change Loss    Number of Pounds Lost 16.8# (13%)    Weight loss time period 4 MONTHS                    Intervention Goal       03/09/18 1052    Intervention Goal    General Nutrition support treatment    PO Advance diet;Other (comment)   WHEN MEDICALLY APPROPRIATE            Nutrition Intervention       03/09/18 1053    Nutrition Intervention    RD/Tech Action Advise alternate selection;Advise available snack;Interview for preference;Encourage intake;Recommend/ordered;Follow Tx progress;Care plan reviewd    Recommended/Ordered Supplement            Nutrition Prescription       03/09/18 1053    Nutrition Prescription PO    PO  Prescription Begin/change supplement   ONCE DIET ORDER PERMITS    Supplement Boost Plus    Supplement Frequency 3 times a day            Education/Evaluation       03/09/18 1054    Monitor/Evaluation    Monitor Per protocol;I&O;PO intake;Supplement intake;Pertinent labs;Weight;Skin status;Symptoms        Electronically signed by:  Day Leroy RD  03/09/18 10:54 AM

## 2018-03-09 NOTE — PROGRESS NOTES
Cardiothoracic Surgery Progress Note      Chief complaint: Desaturations and fatigue       LOS: 1 day      Subjective:  Ms Amado presented to preadmission testing yesterday and was found to have oxygen saturations in the 80's.  She was admitted to the hospitalists for further care.  She currently denies shortness of breath but does complain of fatigue.        Objective:  Vital Signs  Temp:  [97.9 °F (36.6 °C)-98.3 °F (36.8 °C)] 98.1 °F (36.7 °C)  Heart Rate:  [] 73  Resp:  [16] 16  BP: (100-134)/(74-88) 105/74    Physical Exam:   General Appearance: alert, appears stated age and cooperative   Lungs: clear to auscultation, respirations regular, respirations even and respirations unlabored   Heart: regular rhythm & normal rate, normal S1, S2 and no murmur, no karen, no rub       Results:    Results from last 7 days  Lab Units 03/08/18  0918   WBC 10*3/mm3 9.92   HEMOGLOBIN g/dL 13.7   HEMATOCRIT % 43.0   PLATELETS 10*3/mm3 334       Results from last 7 days  Lab Units 03/08/18  0918   SODIUM mmol/L 139   POTASSIUM mmol/L 4.1   CHLORIDE mmol/L 98*   CO2 mmol/L 35.0*   BUN mg/dL 15   CREATININE mg/dL 1.00   GLUCOSE mg/dL 181*   CALCIUM mg/dL 8.7     -CT of the chest performed 3/8/18, personally reviewed, demonstrates the known posterior left lung/mediastinal mass.  She has a moderate left pleural effusion that has increased in size as compared to previous CT.      Assessment:  72 year old  female with a history of hypertension, COPD and tobacco abuse who presents with desaturations at preadmission testing prior to bronchoscopy and EBUS.    Plan:  At this time, I do not think the patient will tolerate sedation for a bronchoscopy and EBUS.  We need to start with a CT guided thoracentesis and send this fluid for culture and cytology.  The patient needs aggressive pulmonary toilet.  I will continue to follow along with her care.        Koko Etienne MD  03/09/18  5:17 AM

## 2018-03-09 NOTE — PROGRESS NOTES
[facility] Medicine Services  PROGRESS NOTE    Patient Name: Brandy Amado  : 1945  MRN: [id number]    Date of Admission: 3/8/2018  Length of Stay: 1  Primary Care Physician: Koko Melchor MD    Subjective   Subjective     CC:  FU hypoxia    HPI:  Son, daughter and son-in-law at bedside. Pt reports feeling better. No specific complaints. No night sweats. Some unintentional weight loss from lack of appetite. 15lbs in the last few months or so. No fevers.     Review of Systems  Gen- No fevers, chills  CV- No chest pain, palpitations  Resp- No cough, dyspnea  GI- No N/V/D, abd pain    Otherwise ROS is negative except as mentioned in the HPI.    Objective   Objective     Vital Signs:   Temp:  [97.1 °F (36.2 °C)-98.3 °F (36.8 °C)] 98.3 °F (36.8 °C)  Heart Rate:  [70-83] 83  Resp:  [16-22] 18  BP: ()/(62-96) 94/64        Physical Exam:  Constitutional: No acute distress, awake, alert on 2LNC  Eyes: PERRLA, sclerae anicteric, no conjunctival injection  HENT: NCAT, mucous membranes moist  Neck: Supple, no thyromegaly, no lymphadenopathy, trachea midline  Respiratory: Decreased BS on L posteriorly and anteriorly, nonlabored respirations   Cardiovascular: RRR, no murmurs, rubs, or gallops, palpable pedal pulses bilaterally  Gastrointestinal: Positive bowel sounds, soft, nontender, nondistended  Musculoskeletal: No bilateral ankle edema, no clubbing or cyanosis to extremities  Psychiatric: Appropriate affect, cooperative  Neurologic: Oriented x 3, strength symmetric in all extremities, Cranial Nerves grossly intact to confrontation, speech clear  Skin: No rashes    Results Reviewed:  I have personally reviewed current lab, radiology, and data and agree.      Results from last 7 days  Lab Units 18  0624 18  0918   WBC 10*3/mm3 5.98 9.92   HEMOGLOBIN g/dL 12.2 13.7   HEMATOCRIT % 40.2 43.0   PLATELETS 10*3/mm3 324 334   INR   --  0.99       Results from last 7 days  Lab  Units 03/09/18  0624 03/08/18  1530 03/08/18  0918   SODIUM mmol/L 139  --  139   POTASSIUM mmol/L 4.4  --  4.1   CHLORIDE mmol/L 98*  --  98*   CO2 mmol/L 33.0*  --  35.0*   BUN mg/dL 11  --  15   CREATININE mg/dL 0.70  --  1.00   GLUCOSE mg/dL 153*  --  181*   CALCIUM mg/dL 7.8*  --  8.7   ALT (SGPT) U/L 69*  --  88*   AST (SGOT) U/L 52*  --  54*   TROPONIN I ng/mL  --  0.028 0.032     BNP   Date Value Ref Range Status   03/08/2018 106.0 (H) 0.0 - 100.0 pg/mL Final     Comment:     Results may be falsely decreased if patient taking Biotin.     No results found for: PHART    Microbiology Results Abnormal     Procedure Component Value - Date/Time    KOH Prep - Body Fluid, Chest [[id number]]  (Normal) Collected:  03/09/18 1355    Lab Status:  Final result Specimen:  Body Fluid from Chest Updated:  03/09/18 1630     KOH Prep No yeast or hyphal elements seen          Imaging Results (last 24 hours)     Procedure Component Value Units Date/Time    CT Angiogram Chest With & Without Contrast [[id number]] Collected:  03/08/18 1551     Updated:  03/08/18 2030    Narrative:       EXAM:    CT Angiography Chest With Intravenous Contrast    CLINICAL HISTORY:    72 years old, female; Signs and symptoms; Dyspnea; Additional info: Dyspnea,   cardiac origin suspected    TECHNIQUE:    Axial computed tomographic angiography images of the chest with intravenous   contrast using pulmonary embolism protocol.  All CT scans at this facility use   one or more dose reduction techniques, viz.: automated exposure control; ma/kV   adjustment per patient size (including targeted exams where dose is matched to   indication; i.e. head); or iterative reconstruction technique.    MIP reconstructed images were created and reviewed.    CONTRAST:    85 mL of Isovue 370 administered intravenously.    COMPARISON:    CR - XR CHEST 1 VW 2018-03-08 15:30    FINDINGS:    Pulmonary arteries:  No evidence of pulmonary embolus.    Aorta:  No aortic aneurysm or  dissection.    Lungs:  Moderate centrilobular emphysematous changes are present bilaterally.    Bibasilar nonspecific lung base density is present, consistent with dependent   atelectasis.  Compressive atelectasis left lung base.  No mass.    Pleural space:  No evidence of pneumothorax.  There is a moderate left   pleural effusion.    Heart:  Normal cardiac size.  No abnormal pericardial fluid.  No evidence of   RV dysfunction.    Mediastinum:  Cystic posterior mediastinal mass which measures approximately   5.7 x 3.5 by 13 cm. The mass drapes over the anterior aorta and compresses the   left atrial and left lower lobe pulmonary vein. Considerations include such   things as bronchogenic cyst, duplication cyst, or neurenteric cyst.    Bones/joints:  No fractures are appreciated.  Age-appropriate degenerative   changes of the spine.  No dislocation.    Soft tissues:  Unremarkable.    Lymph nodes:  No significant adenopathy.    Kidneys and ureters:  There is a simple  cyst in the right kidney.    Other findings:  Mild dependent atelectatic changes.      Impression:       1.  No evidence of pulmonary embolus.  2.  No aortic aneurysm or dissection.  3.  Cystic posterior mediastinal mass. Differential include such things as   bronchogenic cyst, duplication cyst, or neurenteric cyst.    THIS DOCUMENT HAS BEEN ELECTRONICALLY SIGNED BY AMBER DON MD    CT Abdomen Pelvis With & Without Contrast [[id number]] Collected:  03/08/18 1636     Updated:  03/08/18 2053    Narrative:       EXAM:    CT Abdomen and Pelvis Without and With Intravenous Contrast    CLINICAL HISTORY:    72 years old, female; Signs and symptoms; Other: Mass, aortic compression;   Additional info: Chest mass with aortic compression    TECHNIQUE:    Axial computed tomography images of the abdomen and pelvis without and with   intravenous contrast.  All CT scans at this facility use one or more dose   reduction techniques, viz.: automated exposure control;  ma/kV adjustment per   patient size (including targeted exams where dose is matched to indication;   i.e. head); or iterative reconstruction technique.    Coronal reformatted images were created and reviewed.    CONTRAST:    85 mL of Isovue 370 administered intravenously.    COMPARISON:    No relevant prior studies available.    FINDINGS:    Lower thorax:  On this examination which was obtained approximately 5 minutes   after the chest CT examination there is prominent inhomogeneous enhancement of   the mediastinal mass which is only partially visualized on this abdominal   examination. There is also a 4.3 cm enhancing focus projecting into the pleural   fluid at the left medial lung base and there are enhancing foci along the   inferior pleura. Since the mass drapes across the aorta with no discernible   interface the possibility of contrast leaking from the aorta into the mass must   be considered. Differential includes enhancing posterior mediastinal mass such   as a mass of neurogenic origin.  There is a moderate left pleural effusion.    Compressive atelectasis left lung base.     ABDOMEN:    Liver:  Unremarkable.  No mass.    Gallbladder and bile ducts:  Unremarkable.  No calcified stones.  No ductal   dilation.    Pancreas:  Unremarkable.  No mass.  No ductal dilation.    Spleen:  Unremarkable.  No splenomegaly.    Adrenals:  Unremarkable.  No mass.    Kidneys and ureters:  Unremarkable.  No solid mass.  No obstructing stones.    No hydronephrosis.    Stomach and bowel:  Unremarkable.    Appendix:  No findings to suggest acute appendicitis.     PELVIS:    Bladder:  Unremarkable.  No mass.  No stones.    Reproductive:  Unremarkable as visualized.     ABDOMEN and PELVIS:    Intraperitoneal space:  Unremarkable.  No free air.  No significant fluid   collection.    Bones/joints:  No acute fracture.  No dislocation.    Soft tissues:  Unremarkable.    Vasculature:  Unremarkable.  No abdominal aortic aneurysm.     Lymph nodes:  Unremarkable.  No enlarged lymph nodes.      Impression:         Prominent contrast enhancement of the partially visualized mediastinal mass   and contrast enhancement along the inferior and medial aspects of the pleura.   Since the mass drapes over and is inseparable from the aorta the possibility of   contrast leaking from the aorta into the mass must be considered. Differential   includes an enhancing posterior mediastinal mass such as a mass of neurogenic   origin.      THIS REPORT CONTAINS FINDINGS THAT MAY BE CRITICAL TO PATIENT CARE. The   findings were verbally communicated via telephone conference with Chio Smith    at 8:93 PM EST on 3/8/2018. The findings were acknowledged and understood.      THIS DOCUMENT HAS BEEN ELECTRONICALLY SIGNED BY AMBER DON MD    XR Chest 1 View [[id number]] Collected:  03/08/18 1645     Updated:  03/09/18 0941    Narrative:       EXAMINATION: XR CHEST 1 VW-03/08/2018:      INDICATION: SOA.      COMPARISON: NONE.     FINDINGS: Portable chest reveals patchy ill-defined opacification left  lung base with small left pleural effusion. The heart is borderline  enlarged. Underlying chronic and emphysematous changes seen diffusely  throughout the lung fields.           Impression:       Patchy ill-defined opacification left lung base with small  left pleural effusion. The heart is enlarged. Underlying chronic and  emphysematous changes seen within lung fields bilaterally.     D:  03/08/2018  E:  03/08/2018     This report was finalized on 3/9/2018 9:39 AM by Dr. Liliya Pugh MD.       CT Guided Thoracentesis [[id number]] Collected:  03/09/18 1616     Updated:  03/09/18 1746    Narrative:       EXAMINATION: CT-GUIDED LEFT THORACENTESIS, CATHETER DRAINAGE LEFT  PLEURAL SPACE - 03/09/2018     HISTORY: Left pleural effusion, no recent surgery, etiology of fluid  indeterminate. Exertional dyspnea.     Catheter thoracentesis for therapeutic and diagnostic  purposes.     TECHNIQUE: Patient has no known allergies.     The clotting profile is normal. The PT is 10.4, INR 0.99, PTT 26.6,  platelets 334,000.     FINDINGS:  1. The procedure, risks, complications and side effects of CT-guided  catheter drainage of the left chest were discussed and reviewed in  detail with the patient including possible risks and complications which  include pneumothorax, bleeding, infection, pleural injury, injury or  laceration of the intercostal artery with massive bleeding, puncture of  the lung, air embolization, and other possible risks and complications.     The patient understood and consented.     2. With the patient in the supine position, from the left posterior  axillary line, under meticulous CT guidance, sterile technique and local  anesthesia, an 8.5 Syriac catheter was placed intrinsic to the left  lower posterior pleural space.     A total of 1125 mL of slightly turbid xanthochromic fluid was extracted  from the left pleural space.     The left pleural space was tapped completely dry and the left lung  expanded completely after the thoracentesis.     3. The fluid was carefully prepared for the laboratory for all studies  ordered by the attending physician and the fluid and samples were  hand-delivered to the laboratory for immediate study.  4. Post thoracentesis images reveal no pneumothorax, no bleeding and no  acute complication. Catheter was removed uneventfully.       Impression:          Successful CT-guided catheter thoracentesis of the left chest has been  performed and tolerated well tapping the left chest essentially dry as  described above.     Fluid was hand-delivered to the laboratory for all diagnostic studies  ordered by the attending physician.     This most pleasant patient tolerated the procedure well. There were no  complications. She was taken back to her room in satisfactory and stable  condition.     DICTATED:     03/09/2018  EDITED    :     03/09/2018       This report was finalized on 3/9/2018 5:44 PM by Dr. Pj Kramer MD.           Results for orders placed during the hospital encounter of 03/08/18   STAT Adult Transthoracic Echo Complete W/ Cont if Necessary Per Protocol    Narrative · Left ventricular systolic function is normal.  · Estimated EF appears to be in the range of 66 - 70%  · Left ventricular wall thickness is consistent with mild concentric   hypertrophy.  · Left ventricular diastolic dysfunction (grade I) consistent with   impaired relaxation.  · Mild tricuspid valve regurgitation is present.  · Estimated right ventricular systolic pressure from tricuspid   regurgitation is mildly elevated (35-45 mmHg).          I have reviewed the medications.    Assessment/Plan   Assessment / Plan     Hospital Problem List     6cm mass of inferior posterior mediastinum with some compression effect on thoracic aorta     Acute respiratory failure with hypoxia    Anxiety    Unintentional weight loss    Severe malnutrition    Chronic obstructive pulmonary disease with acute exacerbation    Volume depletion    Suspected deep tissue injury right lateral thigh POA    T2DM (type 2 diabetes mellitus)           Brief Hospital Course to date:  Brandy Amado is a 72 y.o. female who was to undergo elective bronch and EBUS for mediastinal mass, found to be hypoxic in pre-op.    Assessment & Plan:  - Acute hypoxemic respiratory failure: 81% on RA. Currently on 2LNC 93%  - Mediastinal mass with extrinsic compression on thoracic aorta and associated pleural effusion: Dr. Etienne on board. s/p thoracentesis 3/9. ECHO 3/8 EF normal, Grade I diastolic disorder, RVSP 35-45mmHg  - COPD: Symbicort, Duonebs. Methylpred to prednisone.   - HTN: Metoprolol BID at reduced dose    DVT Prophylaxis:  [facility]    CODE STATUS: Full Code    Disposition: I expect the patient to be discharged home 1-2 days    Samira Edmonds MD  03/09/18  7:11 PM

## 2018-03-09 NOTE — PLAN OF CARE
Problem: Patient Care Overview (Adult)  Goal: Plan of Care Review  Outcome: Ongoing (interventions implemented as appropriate)   03/09/18 1144   Coping/Psychosocial Response Interventions   Plan Of Care Reviewed With patient   Patient Care Overview   Progress progress toward functional goals as expected   Outcome Evaluation   Outcome Summary/Follow up Plan PT eval completed. Pt. very pleasant w/ good effort; however, pt. limited by fatigue, weakness, and SOA. Pt. presents w/ evolving symptoms of dec strength, balance deficits, dec functional endurance, gait instability, dec motor control, and functional decline. Pt. required min A for bed mob, transfers, and to ambulate 8ft w/ RW w/ cues for safety and sequencing. Pt. will benefit from skilled IPPT to address deficits and promote return to PLOF. Recommend home w/ assist and HHPT upon D/C.       Problem: Inpatient Physical Therapy  Goal: Bed Mobility Goal LTG- PT  Outcome: Ongoing (interventions implemented as appropriate)   03/09/18 1144   Bed Mobility PT LTG   Bed Mobility PT LTG, Date Established 03/09/18   Bed Mobility PT LTG, Time to Achieve 1 wk   Bed Mobility PT LTG, Activity Type all bed mobility   Bed Mobility PT LTG, [place] Level independent   Bed Mobility PT LTG, Outcome goal ongoing     Goal: Transfer Training Goal 1 LTG- PT  Outcome: Ongoing (interventions implemented as appropriate)   03/09/18 1144   Transfer Training PT LTG   Transfer Training PT LTG, Date Established 03/09/18   Transfer Training PT LTG, Time to Achieve 1 wk   Transfer Training PT LTG, Activity Type bed to chair /chair to bed;sit to stand/stand to sit   Transfer Training PT LTG, [place] Level independent   Transfer Training PT LTG, Assist Device walker, rolling   Transfer Training PT LTG, Outcome goal ongoing     Goal: Gait Training Goal LTG- PT  Outcome: Ongoing (interventions implemented as appropriate)   03/09/18 1144   Gait Training PT LTG   Gait Training Goal PT LTG,  Date Established 03/09/18   Gait Training Goal PT LTG, Time to Achieve 1 wk   Gait Training Goal PT LTG, [place] Level supervision required   Gait Training Goal PT LTG, Assist Device walker, rolling   Gait Training Goal PT LTG, Distance to Achieve 100   Gait Training Goal PT LTG, Additional Goal VSS   Gait Training Goal PT LTG, Outcome goal ongoing     Goal: Stair Training Goal LTG- PT  Outcome: Ongoing (interventions implemented as appropriate)   03/09/18 1144   Stair Training PT LTG   Stair Training Goal PT LTG, Date Established 03/09/18   Stair Training Goal PT LTG, Time to Achieve 1 wk   Stair Training Goal PT LTG, Number of Steps 3  (to enter/exit patient's home)   Stair Training Goal PT LTG, [place] Level contact guard assist   Stair Training Goal PT LTG, Assist Device 2 handrails   Stair Training Goal PT LTG, Outcome goal ongoing

## 2018-03-09 NOTE — PLAN OF CARE
Problem: Patient Care Overview (Adult)  Goal: Plan of Care Review  Outcome: Ongoing (interventions implemented as appropriate)   03/09/18 0953   Coping/Psychosocial Response Interventions   Plan Of Care Reviewed With patient;family   Patient Care Overview   Progress no change   Outcome Evaluation   Outcome Summary/Follow up Plan Patient presents with right hip skin inflammation. Small skin tear noted. Patient has history of psoriasis. Not a pressure injury. No painful to palpation. Applied silicone based moisturizer. See skin care orders.

## 2018-03-09 NOTE — THERAPY EVALUATION
Acute Care - Physical Therapy Initial Evaluation  [facility]     Patient Name: Brandy Amado  : 1945  MRN: [id number]  Today's Date: 3/9/2018   Onset of Illness/Injury or Date of Surgery Date: 18  Date of Referral to PT: 18  Referring Physician: C. Mayne, PA-C      Admit Date: 3/8/2018     Visit Dx:    ICD-10-CM ICD-9-CM   1. Impaired functional mobility, balance, gait, and endurance Z74.09 V49.89     Patient Active Problem List   Diagnosis   • 6cm mass of inferior posterior mediastinum with some compression effect on thoracic aorta    • Acute respiratory failure with hypoxia   • Anxiety   • Unintentional weight loss   • Severe malnutrition   • Chronic obstructive pulmonary disease with acute exacerbation   • Volume depletion   • Suspected deep tissue injury right lateral thigh POA   • T2DM (type 2 diabetes mellitus)     Past Medical History:   Diagnosis Date   • Anxiety    • COPD (chronic obstructive pulmonary disease)    • Depression    • Emphysema lung    • Fall     x2 within last 6 months and thinks medication related (xanax)    • [place] (hard of hearing)    • Hypertension      Past Surgical History:   Procedure Laterality Date   • VAGINAL DELIVERY      x4          PT ASSESSMENT (last 72 hours)      PT Evaluation       18 0935 18 1445    Rehab Evaluation    Document Type evaluation  -MB     Subjective Information agree to therapy;complains of;weakness  -MB     Patient Effort, Rehab Treatment good  -MB     Symptoms Noted During/After Treatment shortness of breath;significant change in vital signs   desaturation to 80%, on 2L, w/ mobility  -MB     General Information    Patient Profile Review yes  -MB     Onset of Illness/Injury or Date of Surgery Date 18  -MB     Referring Physician C. Mayne, PA-C  -MB     General Observations Pt. supine w/ IV intact L UE, on 2L/min O2 per NC, telemetry.  Pt's dtr present at bedside.  RN consent for PT.  -MB     Pertinent History Of Current  Problem Pt. presented for preadmission testing for bronchoscopy and EBUS, and was found hypoxic (80% O2 sats on RA), dyspneic, and with c/o increased generalized weakness and h/o recent falls at home.  Pt. adm w/ acute respiratory failure w/ hypoxia.  Recent CT revealed 6cm mass inferior posterior mediastinum.  PMH: HTN, COPD.   -MB     Precautions/Limitations fall precautions;oxygen therapy device and L/min  -MB     Prior Level of Function independent:;bed mobility;transfer;gait;all household mobility;ADL's;min assist:;home management   Pt/family report incr difficulty w/ household ambulation.  -MB     Equipment Currently Used at Home raised toilet;walker, rolling  -MB     Plans/Goals Discussed With patient and family;agreed upon  -MB     Risks Reviewed patient and family:;LOB;nausea/vomiting;dizziness;increased discomfort;change in vital signs;lines disloged  -MB     Benefits Reviewed patient and family:;improve function;increase independence;increase strength;increase balance;decrease pain;decrease risk of DVT;improve skin integrity;increase knowledge  -MB     Barriers to Rehab previous functional deficit  -MB     Living Environment    Lives With spouse  -MB spouse  -MARÍA    Living Arrangements mobile home  -MB mobile home  -MARÍA    Home Accessibility stairs to enter home  -MB no concerns  -MARÍA    Number of Stairs to Enter Home 3  -MB     Stair Railings at Home outside, present at both sides  -MB none  -MARÍA    Type of Financial/Environmental Concern  none  -MARÍA    Transportation Available  car;family or friend will provide  -MARÍA    Clinical Impression    Date of Referral to PT 03/09/18  -MB     PT Diagnosis Functional decline; Impaired mobility  -MB     Functional Level At Time Of Evaluation Pt. requires assist for bed mob, transfers, and short gait distances.  -MB     Patient/Family Goals Statement Return to home and PLOF.  -MB     Criteria for Skilled Therapeutic Interventions Met yes;treatment indicated  -MB     Rehab  Potential good, to achieve stated therapy goals  -MB     Vital Signs    Pre Systolic BP Rehab 115  -MB     Pre Treatment Diastolic BP 79  -MB     Pretreatment Heart Rate (beats/min) 75  -MB     Pre SpO2 (%) 93  -MB     O2 Delivery Pre Treatment supplemental O2   2L  -MB     Intra SpO2 (%) 80  -MB     O2 Delivery Intra Treatment supplemental O2  -MB     Post SpO2 (%) 90  -MB     O2 Delivery Post Treatment supplemental O2  -MB     Pre Patient Position Supine  -MB     Intra Patient Position Standing  -MB     Post Patient Position Sitting  -MB     Pain Assessment    Pain Assessment No/denies pain  -MB     Vision Assessment/Intervention    Visual Impairment WFL  -MB     Cognitive Assessment/Intervention    Current Cognitive/Communication Assessment impaired  -MB     Orientation Status oriented to;person;situation;required verbal cueing (specifiy in comments);place;time  -MB     Follows Commands/Answers Questions 75% of the time;able to follow single-step instructions;needs cueing;needs increased time;needs repetition  -MB     Personal Safety mild impairment;decreased awareness, need for assist;decreased awareness, need for safety;decreased insight to deficits  -MB     Personal Safety Interventions fall prevention program maintained;gait belt;muscle strengthening facilitated;nonskid shoes/slippers when out of bed  -MB     ROM (Range of Motion)    General ROM no range of motion deficits identified  -MB     MMT (Manual Muscle Testing)    General MMT Assessment upper extremity strength deficits identified;lower extremity strength deficits identified  -MB     General MMT Assessment Detail BLEs grossly 4-/5, BUEs 3+/5.  -MB     Bed Mobility, Assessment/Treatment    Bed Mobility, Assistive Device bed rails;head of bed elevated;draw sheet  -MB     Bed Mob, Supine to Sit, [place] minimum assist (75% patient effort);verbal cues required  -MB     Bed Mob, Sit to Supine, [place] not tested  -MB     Bed Mobility, Safety  Issues decreased use of arms for pushing/pulling;decreased use of legs for bridging/pushing  -MB     Bed Mobility, Impairments strength decreased;postural control impaired   SOA  -MB     Bed Mobility, Comment Pt. required VCs for sequencing and assist to raise trunk/shoulders to upright sitting EOB; increased effort and time to complete.  -MB     Transfer Assessment/Treatment    Transfers, Sit-Stand [place] minimum assist (75% patient effort);verbal cues required  -MB     Transfers, Stand-Sit [place] minimum assist (75% patient effort);verbal cues required  -MB     Transfers, Sit-Stand-Sit, Assist Device rolling walker;elevated surface  -MB     Toilet Transfer, [place] minimum assist (75% patient effort);verbal cues required  -MB     Toilet Transfer, Assistive Device bedside commode without drop arms  -MB     Transfer, Safety Issues balance decreased during turns;sequencing ability decreased;step length decreased  -MB     Transfer, Impairments strength decreased;impaired balance;postural control impaired;motor control impaired  -MB     Transfer, Comment Pt. required VCs for sequencing, safe hand placement, and assist to safely complete transfer;increased time and effort to complete.  Pt. transferred sit to stand from EOB and BSC 3x.  Pt. required dep A for post-toilet hygiene.  -MB     Gait Assessment/Treatment    Gait, [place] Level minimum assist (75% patient effort);verbal cues required  -MB     Gait, Assistive Device rolling walker  -MB     Gait, Distance (Feet) 8  -MB     Gait, Gait Pattern Analysis swing-to gait  -MB     Gait, Gait Deviations forward flexed posture;[place] decreased;bilateral:;step length decreased;toe-to-floor clearance decreased  -MB     Gait, Safety Issues balance decreased during turns;sequencing ability decreased;step length decreased;supplemental O2;weight-shifting ability decreased  -MB     Gait, Impairments strength decreased;impaired balance;motor control  impaired;postural control impaired  -MB     Gait, Comment Pt. required max VCs for sequencing, upright posture, adaptive breathing, and inc B step length.  Distance limited by fatigue.  Pt. w/ decreased O2 sats (80% on 2L) w/ mobility.    -MB     Motor Skills/Interventions    Additional Documentation Balance Skills Training (Group)  -MB     Balance Skills Training    Sitting-Level of Assistance Close supervision  -MB     Sitting-Balance Support Feet supported;Right upper extremity supported;Left upper extremity supported  -MB     Sitting-Balance Activities Lateral lean;Forward lean;Reaching across midline;Trunk control activities   sitting balance, BLE TherEx, toileting  -MB     Sitting # of Minutes 10  -MB     Standing-Level of Assistance Minimum assistance  -MB     Static Standing Balance Support assistive device  -MB     Standing-Balance Activities Weight Shift R-L;Weight Shift A-P  -MB     Standing Balance # of Minutes 4  -MB     Therapy Exercises    Bilateral Lower Extremities AROM:;10 reps;sitting;ankle pumps/circles;glut sets;hip flexion;LAQ  -MB     Sensory Assessment/Intervention    Light Touch LLE;RLE  -MB     LLE Light Touch WNL  -MB     RLE Light Touch WNL  -MB     Positioning and Restraints    Pre-Treatment Position in bed  -MB     Post Treatment Position chair  -MB     In Chair notified nsg;reclined;call light within reach;encouraged to call for assist;exit alarm on;with family/caregiver;waffle cushion;legs elevated;heels elevated  -MB       03/08/18 1444 03/08/18 1017    General Information    Equipment Currently Used at Home walker, rolling;raised toilet;nebulizer  -MARÍA walker, rolling;other (see comments)   bp machine   -KM      03/08/18 1002       Living Environment    Lives With spouse  -KM     Living Arrangements mobile home  -KM     Home Accessibility bed and bath on same level  -KM     Stair Railings at Home none  -KM     Type of Financial/Environmental Concern none  -KM     Transportation  Available car  -KM       User Key  (r) = Recorded By, (t) = Taken By, (c) = Cosigned By    Initials Name Provider Type    FOREIGN Castano, RN Registered Nurse    OSCAR Marks, PT Physical Therapist    MARÍA Daniels, RN Registered Nurse          Physical Therapy Education     Title: PT OT SLP Therapies (Done)     Topic: Physical Therapy (Done)     Point: Mobility training (Done)    Learning Progress Summary    Learner Readiness Method Response Comment Documented by Status   Patient Acceptance E,D VU,NR home safety, fall precautions, POC progression, benefits of activity/therapy, D/C planning, gait safety MB 03/09/18 1143 Done   Family Acceptance E,D VU,NR home safety, fall precautions, POC progression, benefits of activity/therapy, D/C planning, gait safety MB 03/09/18 1143 Done               Point: Home exercise program (Done)    Learning Progress Summary    Learner Readiness Method Response Comment Documented by Status   Patient Acceptance E,D VU,NR home safety, fall precautions, POC progression, benefits of activity/therapy, D/C planning, gait safety MB 03/09/18 1143 Done   Family Acceptance E,D VU,NR home safety, fall precautions, POC progression, benefits of activity/therapy, D/C planning, gait safety MB 03/09/18 1143 Done               Point: Body mechanics (Done)    Learning Progress Summary    Learner Readiness Method Response Comment Documented by Status   Patient Acceptance E,D VU,NR home safety, fall precautions, POC progression, benefits of activity/therapy, D/C planning, gait safety MB 03/09/18 1143 Done   Family Acceptance E,D VU,NR home safety, fall precautions, POC progression, benefits of activity/therapy, D/C planning, gait safety MB 03/09/18 1143 Done               Point: Precautions (Done)    Learning Progress Summary    Learner Readiness Method Response Comment Documented by Status   Patient Acceptance E,D VU,NR home safety, fall precautions, POC progression, benefits of  activity/therapy, D/C planning, gait safety MB 03/09/18 1143 Done   Family Acceptance E,D VU,NR home safety, fall precautions, POC progression, benefits of activity/therapy, D/C planning, gait safety MB 03/09/18 1143 Done                      User Key     Initials Effective Dates Name Provider Type Discipline    MB 03/14/16 -  Geovanna Marks, PT Physical Therapist PT                PT Recommendation and Plan  Anticipated Equipment Needs At Discharge: tub bench  Anticipated Discharge Disposition: home with home health, home with assist  Planned Therapy Interventions: balance training, bed mobility training, gait training, home exercise program, patient/family education, stair training, strengthening, transfer training  PT Frequency: daily  Plan of Care Review  Plan Of Care Reviewed With: patient  Progress: progress toward functional goals as expected  Outcome Summary/Follow up Plan: PT goldal completed.  Pt. very pleasant w/ good effort; however, pt. limited by fatigue, weakness, and SOA.  Pt. presents w/ evolving symptoms of dec strength, balance deficits, dec functional endurance, gait instability, dec motor control, and functional decline.  Pt. required min A for bed mob, transfers, and to ambulate 8ft w/ RW w/ cues for safety and sequencing.  Pt. will benefit from skilled IPPT to address deficits and promote return to PLOF.  Recommend home w/ assist and HHPT upon D/C.          IP PT Goals       03/09/18 1144          Bed Mobility PT LTG    Bed Mobility PT LTG, Date Established 03/09/18  -MB      Bed Mobility PT LTG, Time to Achieve 1 wk  -MB      Bed Mobility PT LTG, Activity Type all bed mobility  -MB      Bed Mobility PT LTG, [place] Level independent  -MB      Bed Mobility PT LTG, Outcome goal ongoing  -MB      Transfer Training PT LTG    Transfer Training PT LTG, Date Established 03/09/18  -MB      Transfer Training PT LTG, Time to Achieve 1 wk  -MB      Transfer Training PT LTG, Activity Type bed to  chair /chair to bed;sit to stand/stand to sit  -MB      Transfer Training PT LTG, [place] Level independent  -MB      Transfer Training PT LTG, Assist Device walker, rolling  -MB      Transfer Training PT LTG, Outcome goal ongoing  -MB      Gait Training PT LTG    Gait Training Goal PT LTG, Date Established 03/09/18  -MB      Gait Training Goal PT LTG, Time to Achieve 1 wk  -MB      Gait Training Goal PT LTG, [place] Level supervision required  -MB      Gait Training Goal PT LTG, Assist Device walker, rolling  -MB      Gait Training Goal PT LTG, Distance to Achieve 100  -MB      Gait Training Goal PT LTG, Additional Goal VSS  -MB      Gait Training Goal PT LTG, Outcome goal ongoing  -MB      Stair Training PT LTG    Stair Training Goal PT LTG, Date Established 03/09/18  -MB      Stair Training Goal PT LTG, Time to Achieve 1 wk  -MB      Stair Training Goal PT LTG, Number of Steps 3   to enter/exit patient's home  -MB      Stair Training Goal PT LTG, [place] Level contact guard assist  -MB      Stair Training Goal PT LTG, Assist Device 2 handrails  -MB      Stair Training Goal PT LTG, Outcome goal ongoing  -MB        User Key  (r) = Recorded By, (t) = Taken By, (c) = Cosigned By    Initials Name Provider Type    OSCAR Marks, PT Physical Therapist                Outcome Measures       03/09/18 0935          How much help from another person do you currently need...    Turning from your back to your side while in flat bed without using bedrails? 3  -MB      Moving from lying on back to sitting on the side of a flat bed without bedrails? 3  -MB      Moving to and from a bed to a chair (including a wheelchair)? 3  -MB      Standing up from a chair using your arms (e.g., wheelchair, bedside chair)? 3  -MB      Climbing 3-5 steps with a railing? 2  -MB      To walk in hospital room? 3  -MB      AM-PAC 6 Clicks Score 17  -MB      Functional Assessment    Outcome Measure Options AM-PAC 6 Clicks Basic  Mobility (PT)  -MB        User Key  (r) = Recorded By, (t) = Taken By, (c) = Cosigned By    Initials Name Provider Type    OSCAR Marks PT Physical Therapist           Time Calculation:         PT Charges       03/09/18 1151          Time Calculation    Start Time 0935  -MB      PT Received On 03/09/18  -MB      PT Goal Re-Cert Due Date 03/19/18  -MB      Time Calculation- PT    Total Timed Code Minutes- PT 14 minute(s)  -MB        User Key  (r) = Recorded By, (t) = Taken By, (c) = Cosigned By    Initials Name Provider Type    OSCAR Marks PT Physical Therapist          Therapy Charges for Today     Code Description Service Date Service Provider Modifiers Qty    26193668289 HC PT MOBILITY CURRENT 3/9/2018 Geovanna Marks, PT GP, CK 1    54819785627 HC PT MOBILITY PROJECTED 3/9/2018 Geovanna Marks, PT GP, CJ 1    15149511774 HC PT EVAL MOD COMPLEXITY 4 3/9/2018 Geovanna Marks, PT GP 1    94180017565 HC PT THER PROC EA 15 MIN 3/9/2018 Geovanna Marks, PT GP 1          PT G-Codes  PT Professional Judgement Used?: Yes  Outcome Measure Options: AM-PAC 6 Clicks Basic Mobility (PT)  Score: 17  Functional Limitation: Mobility: Walking and moving around  Mobility: Walking and Moving Around Current Status (): At least 40 percent but less than 60 percent impaired, limited or restricted  Mobility: Walking and Moving Around Goal Status (): At least 20 percent but less than 40 percent impaired, limited or restricted      Geovanna Marks PT  3/9/2018

## 2018-03-09 NOTE — PLAN OF CARE
Problem: Patient Care Overview (Adult)  Goal: Plan of Care Review  Outcome: Ongoing (interventions implemented as appropriate)   03/09/18 0550   Coping/Psychosocial Response Interventions   Plan Of Care Reviewed With patient;daughter   Patient Care Overview   Progress improving   Outcome Evaluation   Outcome Summary/Follow up Plan Patient has rested well tonight with no complaints of any kind. The patient is on 2LNC and her vital signs are stable. The patient is NPO for a pending procedure this morning.        Problem: Anxiety (Adult)  Goal: Identify Related Risk Factors and Signs and Symptoms  Outcome: Ongoing (interventions implemented as appropriate)    Goal: Reduction/Resolution  Outcome: Ongoing (interventions implemented as appropriate)      Problem: Respiratory Insufficiency (Adult)  Goal: Identify Related Risk Factors and Signs and Symptoms  Outcome: Ongoing (interventions implemented as appropriate)    Goal: Acid/Base Balance  Outcome: Ongoing (interventions implemented as appropriate)    Goal: Effective Ventilation  Outcome: Ongoing (interventions implemented as appropriate)

## 2018-03-09 NOTE — PROGRESS NOTES
Discharge Planning Assessment  [facility]     Patient Name: Brandy Amado  MRN: [id number]  Today's Date: 3/9/2018    Admit Date: 3/8/2018          Discharge Needs Assessment       03/09/18 1335    Living Environment    Lives With spouse    Living Arrangements mobile home    Home Accessibility no concerns    Type of Financial/Environmental Concern none    Transportation Available car;family or friend will provide    Living Environment    Provides Primary Care For no one    Quality Of Family Relationships supportive;helpful;involved    Able to Return to Prior Living Arrangements yes    Discharge Needs Assessment    Concerns To Be Addressed discharge planning concerns    Readmission Within The Last 30 Days no previous admission in last 30 days    Anticipated Changes Related to Illness none    Equipment Currently Used at Home raised toilet;walker, rolling;nebulizer    Equipment Needed After Discharge oxygen    Discharge Facility/Level Of Care Needs home with home health    Current Discharge Risk chronically ill    Discharge Disposition still a patient            Discharge Plan       03/09/18 8041    Case Management/Social Work Plan    Plan Return home with spouse and home health    Patient/Family In Agreement With Plan yes    Additional Comments Attempted to meet with patient at bedside, but she was out of the room for a procedure. Gathered initial discharge planning information from patient's daughter, Yvonne Juarez. Patient lives in a mobile home with her , Noe Amado. Per Yvonne, patient has emphysema and  has COPD and mobility issues due to back and knee pain. Patient does not drive and never has and  drives on a limited basis. Yvonne and her siblings try to help as much as they can, but they all work full time and cannot stay with patient and spouse continuously.     Discussed possibility of short-term inpatient rehab with Yvonne, but she does not feel that patient would be agreeable,  mainly due to her anxiety. She feels that patient would be more agreeable to going home with home health skilled nursing and physical therapy (orders have been entered), which was also recommended by PT today. They do not have a home health agency preference. CM will discuss this further with patient closer to discharge.    Patient does not have home oxygen, but may need it. They would prefer to use [facility] (P: [phone]) if needed at D/C. She does have a rolling walker, nebulizer, and raised toilet. CM will continue to follow. Yuki Massey RN [phone]        Discharge Placement     No information found        Expected Discharge Date and Time     Expected Discharge Date Expected Discharge Time    Mar 13, 2018               Demographic Summary       03/09/18 1333    Referral Information    Arrived From home or self-care    Referral Source admission list    Primary Care Physician Information    Name Koko Melchor MD            Functional Status       03/09/18 1334    Functional Status Prior    Ambulation 1-->assistive equipment    Transferring 0-->independent    Toileting 1-->assistive equipment    Bathing 0-->independent    Dressing 0-->independent    Eating 0-->independent    Communication 0-->understands/communicates without difficulty    IADL    Medications independent    Meal Preparation assistive person    Housekeeping assistive person    Laundry assistive person    Shopping assistive person    Oral Care independent    Activity Tolerance    Usual Activity Tolerance fair    Current Activity Tolerance poor    Employment/Financial    Employment/Finance Comments Medicare [employer] and [place] Medicaid with prescription medication coverage            Psychosocial     None            Abuse/Neglect     None            Legal     None            Substance Abuse     None            Patient Forms     None          Yuki Massey RN

## 2018-03-09 NOTE — NURSING NOTE
Ms. Amado is a pleasant lady presenting to radiology for a CT guided thoracentesis on her left lung. She is on 3LNC, placed on monitors and initial scans are complete. Images have been reviewed by Dr. Kramer and he has spoken to the patient. A total volume of 1125 Ml of pink fluid was removed without incident. Patient vitals remained stable and she tolerated well. Samples were sent to the lab for analysis. Report was called to RN on 4G. Patient was transported back to her room via stretcher.

## 2018-03-09 NOTE — CONSULTS
Diabetes education consult noted. Scheduled for testing today that involves sedation, will see at a more appropriate time. Will follow.

## 2018-03-10 PROBLEM — J90 PLEURAL EFFUSION: Status: ACTIVE | Noted: 2018-01-01

## 2018-03-10 PROBLEM — J96.01 ACUTE HYPOXEMIC RESPIRATORY FAILURE (HCC): Status: ACTIVE | Noted: 2018-01-01

## 2018-03-10 NOTE — DISCHARGE PLACEMENT REQUEST
Brandy Amado (72 y.o. Female)     To [facility]  From [facility]()  [phone]          [address] [address]  Phone:  [phone]  Fax:   Date: Mar 9, 2018      Ambulatory Referral to Home Health     Patient:  Brandy Amado MRN:  [id number]   [address] [address] :  1945  SSN:    Phone: [phone] Sex:  F      INSURANCE PAYOR PLAN GROUP # SUBSCRIBER ID   Primary:  Secondary: MEDICARE  [place] [id number]  [id number]   [id number]  [id number]      Referring Provider Information:  CAROLINA CHAVIS Phone: [phone] Fax:       Referral Information:   # Visits:  1 Referral Type: Home Health [42]   Urgency:  Routine Referral Reason: Specialty Services Required   Start Date: Mar 9, 2018 End Date:  To be determined by Insurer   Diagnosis: Impaired functional mobility, balance, gait, and endurance (Z74.09 [ICD-10-CM] V49.89 [ICD-9-CM])      Refer to Dept:   Refer to Provider:   Refer to Facility:       Face to Face Visit Date: 3/9/2018  Follow-up Provider for Plan of Care? I treated the patient in an acute care facility and will not continue treatment after discharge.  Follow-up Provider: BRITTANI SCHULTE [[id number]]  Reason/Clinical Findings: weakness, deconditioning, decreased oxygen, low blood pressure  Describe mobility limitations that make leaving home difficult: weakness, deconditioning, decreased oxygen, low blood pressure  Nursing/Therapeutic Services Requested: Skilled Nursing  Nursing/Therapeutic Services Requested: Physical Therapy  Skilled nursing orders: Medication education (emphysema management)  Skilled nursing orders: O2 instruction  PT orders: Therapeutic exercise  PT orders: Gait Training  PT orders: Strengthening  PT orders: Home safety assessment  PT orders: Transfer training  Weight Bearing Status: Full Weight Bearing     This document serves as a request of services and does not  "constitute Insurance authorization or approval of services.  To determine eligibility, please contact the members Insurance carrier to verify and review coverage.     If you have medical questions regarding this request for services. Please contact [facility] at [phone] between the hours of 8:00am - 5:00pm (Mon-Fri).             Verbal Order Mode: Telephone with readback   Authorizing Provider: Samira Edmonds MD  Authorizing Provider's NPI: [id number]     Order Entered By: Yuki Massey RN 3/9/2018  1:42 PM     Electronically signed by:               Date of Birth Social Security Number Address Home Phone MRN    1945  [address] [place] [address] [phone] [id number]    [employer] Mar[employer] Status          None        Admission Date Admission Type Admitting Provider Attending Provider Department, Room/Bed    3/8/18 Urgent Samira Edmonds MD Krill, Kaleigh, MD [facility], S461/1    Discharge Date Discharge Disposition Discharge Destination         Home or Self Care              Attending Provider:  Samira Edmonds MD    Allergies:  No Known Allergies    Isolation:  None   Infection:  None   Code Status:  FULL    Ht:  157.5 cm (62\")   Wt:  56.6 kg (124 lb 12.8 oz)    Admission Cmt:  None   Principal Problem:  Acute hypoxemic respiratory failure [J96.01]                 Active Insurance as of 3/8/2018     Primary Coverage     Payor Plan Insurance Group Employer/Plan Group    MEDICARE MEDICARE A & B      Payor Plan Address Payor Plan Phone Number Effective From Effective To    [address] [phone] 4/1/2011     [place] [address]       Subscriber Name Subscriber Birth Date Member ID       ARCELIA OLEA 1945 [id number]           Secondary Coverage     Payor Plan Insurance Group Employer/Plan Group    [place]      Payor Plan Address Payor Plan Phone Number Effective From Effective To    [address] " [phone] 2018     [place] [address]       Subscriber Name Subscriber Birth Date Member ID       ARCELIA AMADO 1945 [id number]                 Emergency Contacts      (Rel.) Home Phone Work Phone Mobile Phone    Noe Amado (Spouse) [phone] -- --    Yvonne Juarez (Daughter) [phone] -- --               History & Physical      Jazlyn Parker MD at 3/8/2018  2:52 PM              [facility] Medicine Services  HISTORY AND PHYSICAL    Patient Name: Arcelia Amado  : 1945  MRN: [id number]  Primary Care Physician: Koko Melchor MD    Subjective   Subjective     Chief Complaint:  SOA    HPI:  Arcelia Amado is a 72 y.o. female with PMH of HTN, COPD and long history of smoking, still using E-cigarette, anxiety/depression, 17lb unintentional wt loss x 4mos.  Review of records shows was seen 18 in ED for Dyspnea, treated for acute bronchitis. CT of chest order 18 showed 6cm mass of inferior posterior mediastinum with compression effect on thoracic aorta referred to CTS, Dr Etienne for eval, planning bronch with bx    Pt arrived at preop today and noted to have significant dyspnea, hypoxic satting 81% on RA, placed on 3L NC improved to 93%, being admitted to hospitalist service for further w/u and management.    The patient is a fair historian, daughter present.  Patient reports progressive dyspnea over the several weeks, significantly worsened over the past few days.  Reports significant dyspnea with minimal exertion, as well as dyspnea at rest, no PND.  Does not use supplemental O2 at home.  Over the past few days has been having to use rescue inhaler frequently with minimal improvement.  Some lightheadedness, weakness with activity.  Recent falls noted per daughter.  Poor po, decreased fluid intake and dry mouth, takes Ensure TID.  Patient denies cardiac history, takes metoprolol, Lasix, Vasotec reportedly for hypertension, however  stopped taking all these medications one week ago after found to be hypotensive at CT clinic eval, SBP 70s.  Patient with a history of anxiety takes Xanax twice a day.  Denies nightsweats, chest pain, palpitations, N/V.         Wbc normal, , CO2 35, AST 54, ALT 88, A1c 6.8%. EKG showed sinus tachy with QTc 552.     Review of Systems   Constitutional: Positive for activity change, appetite change, fatigue and unexpected weight change. Negative for chills and fever.   HENT: Negative for rhinorrhea and sore throat.    Eyes: Negative.    Respiratory: Positive for shortness of breath and wheezing.    Cardiovascular: Negative for chest pain, palpitations and leg swelling.   Gastrointestinal: Negative for nausea and vomiting.   Endocrine: Negative for polydipsia and polyuria.   Genitourinary: Positive for decreased urine volume. Negative for difficulty urinating and dysuria.   Musculoskeletal: Positive for gait problem. Negative for myalgias.   Skin: Positive for wound (right thigh). Negative for rash.   Neurological: Positive for dizziness and weakness. Negative for syncope.   Hematological: Negative for adenopathy. Does not bruise/bleed easily.   Psychiatric/Behavioral: Positive for confusion (occasionally with visual hallucinations).        Anxiety        Otherwise 10-system ROS reviewed and is negative except as mentioned in the HPI.    Personal History     Past Medical History:   Diagnosis Date   • Anxiety    • Depression    • Emphysema lung    • Fall     x2 within last 6 months and thinks medication related (xanax)    • [place] (hard of hearing)    • Hypertension        Past Surgical History:   Procedure Laterality Date   • VAGINAL DELIVERY      x4   No prior surgeries    Family History: family history includes COPD in her sister; Heart attack in her father; Lung cancer in her brother; No Known Problems in her daughter and son; Stroke in her mother.     Social History:  reports that she quit smoking about a year  ago. Her smoking use included Cigarettes. She has a 72.00 pack-year smoking history. She has never used smokeless tobacco. She reports that she does not drink alcohol or use illicit drugs.  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   3/8/2018 at Unknown time   • ALPRAZolam (XANAX) 1 MG tablet Take 1 mg by mouth 2 (Two) Times a Day As Needed for Anxiety.   3/8/2018 at Unknown time   • amLODIPine (NORVASC) 10 MG tablet Take 10 mg by mouth Daily.   Past Week at Unknown time   • enalapril (VASOTEC) 10 MG tablet Take 10 mg by mouth 2 (Two) Times a Day.   Past Week at Unknown time   • FLUoxetine (PROzac) 20 MG capsule Take 60 mg by mouth Daily.   3/7/2018 at Unknown time   • furosemide (LASIX) 20 MG tablet Take 20 mg by mouth Daily.   Past Week at Unknown time   • metoprolol tartrate (LOPRESSOR) 50 MG tablet Take 25 mg by mouth 2 (Two) Times a Day.   Past Week at Unknown time   • calcipotriene (DOVONEX) 0.005 % cream Apply 1 application topically As Needed.   Unknown at Unknown time       No Known Allergies    Objective   Objective     Vital Signs:   Temp:  [98.3 °F (36.8 °C)] 98.3 °F (36.8 °C)  Heart Rate:  [101-118] 109  Resp:  [16] 16  BP: (100-134)/(82-88) 119/87        Physical Exam   Constitutional: ill appearing cachetic female awake, alert in moderate resp distress  Eyes: PERRLA, sclerae anicteric, no conjunctival injection  HENT: NCAT, mucous membranes dry  Neck: Supple, trachea midline  Respiratory: tachypneic, diminished throughout with diffuse expiratory wheezes, sattting 90%on 3L   Cardiovascular: tachy and slightly irregular, no murmurs, rubs, or gallops, palpable pedal pulses bilaterally  Gastrointestinal: Positive bowel sounds,  soft, nontender, nondistended  Musculoskeletal: No bilateral ankle edema, no clubbing or cyanosis to extremities  Psychiatric: anxious affect, cooperative  Neurologic: [place], Oriented x 3, strength symmetric in all extremities, Cranial Nerves II-XII  grossly intact to confrontation, speech clear  Skin: ~10cm area of DTI to right lateral thigh w/o ulceration. No rashes  Results Reviewed:  I have personally reviewed current lab, radiology, and data and agree.      Results from last 7 days  Lab Units 03/08/18  0918   WBC 10*3/mm3 9.92   HEMOGLOBIN g/dL 13.7   HEMATOCRIT % 43.0   PLATELETS 10*3/mm3 334   INR  0.99       Results from last 7 days  Lab Units 03/08/18  1530 03/08/18  0918   SODIUM mmol/L  --  139   POTASSIUM mmol/L  --  4.1   CHLORIDE mmol/L  --  98*   CO2 mmol/L  --  35.0*   BUN mg/dL  --  15   CREATININE mg/dL  --  1.00   GLUCOSE mg/dL  --  181*   CALCIUM mg/dL  --  8.7   ALT (SGPT) U/L  --  88*   AST (SGOT) U/L  --  54*   TROPONIN I ng/mL 0.028 0.032     Estimated Creatinine Clearance: 44.1 mL/min (by C-G formula based on Cr of 1).  Brief Urine Lab Results     None        BNP   Date Value Ref Range Status   03/08/2018 106.0 (H) 0.0 - 100.0 pg/mL Final     Comment:     Results may be falsely decreased if patient taking Biotin.     No results found for: PHART  Imaging Results (last 24 hours)     Procedure Component Value Units Date/Time    XR Chest 1 View [[id number]] Collected:  03/08/18 1645     Updated:  03/08/18 1645    Narrative:       EXAMINATION: XR CHEST 1 VW-03/08/2018:      INDICATION: SOA.      COMPARISON: NONE.     FINDINGS: Portable chest reveals patchy ill-defined opacification left  lung base with small left pleural effusion. The heart is borderline  enlarged. Underlying chronic and emphysematous changes seen diffusely  throughout the lung fields.           Impression:       Patchy ill-defined opacification left lung base with small  left pleural effusion. The heart is enlarged. Underlying  chronic and  emphysematous changes seen within lung fields bilaterally.     D:  03/08/2018  E:  03/08/2018                      Assessment/Plan   Assessment / Plan     Hospital Problem List     6cm mass of inferior posterior mediastinum with some compression effect on thoracic aorta     Acute respiratory failure with hypoxia    Anxiety    Unintentional weight loss    Severe malnutrition    Chronic obstructive pulmonary disease with acute exacerbation    Congenital QT prolongation on electrocardiogram (ECG)    Volume depletion    Suspected deep tissue injury right lateral thigh POA    T2DM (type 2 diabetes mellitus)            Assessment & Plan:    Acute hypoxic resp failure/COPD EXAC  -currently on 3L NC maintaining low 90s  -2/8/18 CT of chest showed 6cm mediastinal mass with some compression of aorta  -ordered stat CTA of chest. NO PE, CONCERN, MEDIASTINAL MASS ENHANCED ON DELAYED IMAGES, CONCERN FOR POSS LEAKAGE FROM AORTA; DR ETIENNE MADE AWARE  -Adding on CT of abd and pelvis with and without as previously schedule for outpt today.   -lactate and BNP normal, reviewed CXR chronic changes noted no infiltrates or effusion  -ECHO ordered  -STAT nebs, METHYLprednisoLONE 60mg BID      6cm Mediastinal mass  -Consult CTS, Dr Etienne planning bronch with bx    Tachycardic, noted QT prolongation on EKG this a.m.   -EKG Qtc 552 now 602, will repeat EKG in 1 hr  -home metroprolol with holding parameter for hypotension  -hold SSRI  -K+, Mg, TSH, and troponin x2 normal   -Cardiology consult for preop eval  CONCERNED FOR POSS NEW ONSET AFIB/POSS PROLONGED QTC. CARD EVALUATED, CLEARED FOR SURGERY TOMORROW.    HTN, recent hypotension  -continue to hold norvasc, enalapril and lasix    Anxiety/depression  -continue home xanax, holding SSRI 2nd to QTc    New dx T2DM  -A1c 6.8%  -SSI, DM educator  -check TSH    Weakness, recent fall  -fall precautions  -PT tomorrow    Decreased UOP  -bladder scan, reports only voiding once  today    Suspected DTI to right lateral thigh POA  -WOC consult    Nicotine dependence  -nictone patch.     Severe malnutrition  nutrition consult  DVT prophylaxis:heparin sq  q12hr for now (pending findings on CTA and EKG, may hold in a.m. For bronch)    CODE STATUS:  Full Code        Brief Attending Admission Attestation     I have seen and examined the patient, performing an independent face-to-face diagnostic evaluation with plan of care reviewed and developed with the advanced practice clinician (APC).      Brief Summary Statement/HPI:   Brandy Amado is a 72 y.o. female with history of mediastinal mass that is compressing on the aorta, being followed by Dr. Etienne and was scheduled to half an elective surgery tomorrow.  She also has a history of heavy tobacco abuse, COPD/emphysema, hypertension, and newly diagnosed diabetes type 2. Patient presented for preop evaluation today and was found to be hypoxic, oxygen saturation as low as 80% on room air, so hospitalist was asked to admit for further evaluation.  EKG also showed sinus tachycardia, quality was poor, concern for possible new onset A. fib.  QT C was also prolonged.      Attending Physical Exam:  General Assessment: No acute cardiopulmonary distress.  Frail/thin    HEENT: NCAT, PERRL, MM MOIST    Neck: Supple, no carotid bruit bilat    CVS: RRR, S1S2 normal, no murmurs    Resp: Very diminished airflow bilaterally, no crackles, no wheezing, respiration is moderately labored, oxygen saturation 97% on 4 L at the moment     Abd: soft, NT, ND, normal BS, no guarding or peritoneal signs    Ext: No edema, both calves are symmetric and NTTP    Neuro: No A show asymmetry, speech clear    Skin: W/D/I. No rash.    Psych: Affect is appropriate    Brief Assessment/Plan :  See above for further detailed assessment and plan developed with APC which I have reviewed and/or edited.      Electronically signed by Jazlyn Parker MD, 03/08/18, 8:52 PM.              Admission Status:  I believe this patient meets INPATIENT status due to the need for care which can only be reasonably provided in an hospital setting such as aggressive/expedited ancillary services and/or consultation services, the necessity for IV medications, close physician monitoring and/or the possible need for procedures.  In such, I feel patient’s risk for adverse outcomes and need for care warrant INPATIENT evaluation and predict the patient’s care encounter to likely last beyond 2 midnights.      Electronically signed by Casie M Mayne, PA-C, 03/08/18, 2:52 PM.        Electronically signed by Jazlyn Parker MD at 3/8/2018  9:00 PM       Hospital Medications (active)       Dose Frequency Start End    ALPRAZolam (XANAX) tablet 1 mg 1 mg 2 Times Daily PRN 3/8/2018 3/18/2018    Sig - Route: Take 1 tablet by mouth 2 (Two) Times a Day As Needed for Anxiety (hold for sedation). - Oral    budesonide-formoterol (SYMBICORT) 80-4.5 MCG/ACT inhaler 2 puff 2 puff 2 Times Daily - RT 3/9/2018     Sig - Route: Inhale 2 puffs 2 (Two) Times a Day. - Inhalation    dextrose (D50W) solution 25 g 25 g Every 15 Minutes PRN 3/8/2018     Sig - Route: Infuse 50 mL into a venous catheter Every 15 (Fifteen) Minutes As Needed for Low Blood Sugar (Blood Sugar Less Than 70). - Intravenous    dextrose (GLUTOSE) oral gel 15 g 15 g Every 15 Minutes PRN 3/8/2018     Sig - Route: Take 15 g by mouth Every 15 (Fifteen) Minutes As Needed for Low Blood Sugar (Blood sugar less than 70). - Oral    famotidine (PEPCID) tablet 40 mg 40 mg Daily 3/9/2018     Sig - Route: Take 2 tablets by mouth Daily. - Oral    FLUoxetine (PROzac) capsule 20 mg 20 mg Daily 3/9/2018     Sig - Route: Take 1 capsule by mouth Daily. - Oral    folic acid (FOLVITE) tablet 1 mg 1 mg Daily 3/10/2018     Sig - Route: Take 1 tablet by mouth Daily. - Oral    glucagon (human recombinant) (GLUCAGEN DIAGNOSTIC) injection 1 mg 1 mg As Needed 3/8/2018     Sig -  Route: Inject 1 mg under the skin As Needed (Blood Glucose Less Than 70). - Subcutaneous    heparin (porcine) 5000 UNIT/ML injection 5,000 Units 5,000 Units Every 12 Hours Scheduled 3/8/2018     Sig - Route: Inject 1 mL under the skin Every 12 (Twelve) Hours. - Subcutaneous    Hold medication 1 each Continuous PRN 3/9/2018 3/10/2018    Sig - Route: 1 each Continuous As Needed (For 24 Hours Prior to Thoracentesis). - Does not apply    Notes to Pharmacy: Already left floor for procedure.    insulin lispro (humaLOG) injection 0-7 Units 0-7 Units 4 Times Daily With Meals & Nightly 3/8/2018     Sig - Route: Inject 0-7 Units under the skin 4 (Four) Times a Day With Meals & at Bedtime. - Subcutaneous    iopamidol (ISOVUE-370) 76 % injection 50 mL 50 mL Once in Imaging 3/10/2018 3/10/2018    Sig - Route: Infuse 50 mL into a venous catheter Once. - Intravenous    ipratropium-albuterol (DUO-NEB) nebulizer solution 3 mL 3 mL Every 6 Hours PRN 3/8/2018     Sig - Route: Take 3 mL by nebulization Every 6 (Six) Hours As Needed for Wheezing or Shortness of Air. - Nebulization    ipratropium-albuterol (DUO-NEB) nebulizer solution 3 mL 3 mL Every 6 Hours While Awake - RT 3/10/2018     Sig - Route: Take 3 mL by nebulization Every 6 (Six) Hours While Awake. - Nebulization    lidocaine PF 1% (XYLOCAINE) injection 20 mL 20 mL Once 3/9/2018     Sig - Route: Inject 20 mL under the skin 1 (One) Time. - Subcutaneous    Cosign for Ordering: Accepted by Pj Kramer MD on 3/9/2018  3:22 PM    magic mouthwash oral supsension 5 mL 5 mL Every 6 Hours Scheduled 3/8/2018     Sig - Route: Swish and spit 5 mL Every 6 (Six) Hours. - Swish & Spit    metoprolol tartrate (LOPRESSOR) half tablet 12.5 mg 12.5 mg Every 12 Hours Scheduled 3/9/2018     Sig - Route: Take 1 half tablet by mouth Every 12 (Twelve) Hours. - Oral    multivitamin with minerals 1 tablet 1 tablet Daily 3/9/2018     Sig - Route: Take 1 tablet by mouth Daily. - Oral    nicotine  (NICODERM CQ) 21 MG/24HR patch 1 patch 1 patch Every 24 Hours Scheduled 3/8/2018     Sig - Route: Place 1 patch on the skin Daily. - Transdermal    predniSONE (DELTASONE) tablet 40 mg 40 mg Daily With Breakfast 3/10/2018     Sig - Route: Take 2 tablets by mouth Daily With Breakfast. - Oral    sodium chloride 0.9 % flush 1-10 mL 1-10 mL As Needed 3/8/2018     Sig - Route: Infuse 1-10 mL into a venous catheter As Needed for Line Care. - Intravenous    ipratropium-albuterol (DUO-NEB) nebulizer solution 3 mL (Discontinued) 3 mL 4 Times Daily - RT 3/8/2018 3/9/2018    Sig - Route: Take 3 mL by nebulization 4 (Four) Times a Day. - Nebulization    ipratropium-albuterol (DUO-NEB) nebulizer solution 3 mL (Discontinued) 3 mL Every 4 Hours PRN 3/9/2018 3/10/2018    Sig - Route: Take 3 mL by nebulization Every 4 (Four) Hours As Needed for Shortness of Air. - Nebulization    methylPREDNISolone sodium succinate (SOLU-Medrol) injection 60 mg (Discontinued) 60 mg Every 12 Hours Scheduled 3/8/2018 3/9/2018    Sig - Route: Infuse 0.96 mL into a venous catheter Every 12 (Twelve) Hours. - Intravenous    metoprolol tartrate (LOPRESSOR) tablet 25 mg (Discontinued) 25 mg Every 12 Hours Scheduled 3/9/2018 3/9/2018    Sig - Route: Take 1 tablet by mouth Every 12 (Twelve) Hours. - Oral    sodium chloride 0.9 % infusion (Discontinued) 100 mL/hr Continuous 3/8/2018 3/9/2018    Sig - Route: Infuse 100 mL/hr into a venous catheter Continuous. - Intravenous          Physical Therapy Notes (last 24 hours) (Notes from 3/9/2018 12:09 PM through 3/10/2018 12:09 PM)     No notes of this type exist for this encounter.        Discharge Summary     No notes of this type exist for this encounter.

## 2018-03-10 NOTE — PROGRESS NOTES
"Brandy Amado  [id number]  1945     LOS: 2 days   Patient Care Team:  Koko Melchor MD as PCP - General (Family Medicine)  Olya Ferrari, RN as Care Coordinator (Population Health)    Chief Complaint: Weakness and shortness of breath      Subjective: Patient is seen and evaluated.  She sleepy right now she is comfortable.  She denies shortness of breath    Objective:     Vital Sign Min/Max for last 24 hours  Temp  Min: 97.1 °F (36.2 °C)  Max: 98.5 °F (36.9 °C)   BP  Min: 84/56  Max: 129/84   Pulse  Min: 70  Max: 84   Resp  Min: 12  Max: 22   SpO2  Min: 89 %  Max: 99 %   Flow (L/min)  Min: 2  Max: 2   Weight  Min: 56.6 kg (124 lb 12.8 oz)  Max: 56.6 kg (124 lb 12.8 oz)     Flowsheet Rows    Flowsheet Row First Filed Value   Admission Height 157.5 cm (62\") Documented at 03/08/2018 1900   Admission Weight 54.1 kg (119 lb 3.2 oz) Documented at 03/08/2018 1520          Physical Exam:Afebrile, vital signs stable.  Alert and oriented ×3  CV regular rate and rhythm  Lungs diminished left base    Wound:    Pulses:     Mediastinal and Chest Tube Drainage:       Results Review:     Results from last 7 days  Lab Units 03/10/18  0617   WBC 10*3/mm3 7.26   HEMOGLOBIN g/dL 11.5   HEMATOCRIT % 37.8   PLATELETS 10*3/mm3 298       Results from last 7 days  Lab Units 03/09/18  0624   SODIUM mmol/L 139   POTASSIUM mmol/L 4.4   CHLORIDE mmol/L 98*   CO2 mmol/L 33.0*   BUN mg/dL 11   CREATININE mg/dL 0.70   GLUCOSE mg/dL 153*   CALCIUM mg/dL 7.8*             Assessment    Active Problems:    6cm mass of inferior posterior mediastinum with some compression effect on thoracic aorta     Acute respiratory failure with hypoxia    Anxiety    Unintentional weight loss    Severe malnutrition    Chronic obstructive pulmonary disease with acute exacerbation    Volume depletion    Suspected deep tissue injury right lateral thigh POA    T2DM (type 2 diabetes mellitus)      Left chest mass mass with compression effect, status post " thoracentesis for a large left effusion still has residual left effusion, small.  We'll follow with x-rays and being medically treated by themedicine service        Kaleb Connelly PA-C  03/10/18  7:20 AM      Please note that portions of this note were completed with a voice recognition program. Efforts were made to edit the dictations, but words may be mistranscribed

## 2018-03-10 NOTE — PROGRESS NOTES
Continued Stay Note  [facility]     Patient Name: Brandy Amado  MRN: [id number]  Today's Date: 3/10/2018    Admit Date: 3/8/2018          Discharge Plan     Row Name 03/10/18 1210       Plan    Plan discharge plan    Patient/Family in Agreement with Plan yes    Plan Comments Plan is home with family and HH. Pt has no preference to HH in [facility]. Referral faxed including order and face sheet to [facility] at [phone]. Message left on Crystal Massey RN() at [phone] to follow up with [facility] on Monday at [phone]. Pt states her walker at home is borrowed and needs one. Orders for rolling walker and home o2 noted. Pt has no DME preferrence.  Referral faxed to [facility] at [phone]. Spoke with Crystal and she will deliver rolling walker and portable O2 to pt room prior to discharge today. Per Crystal, [facility] will be in touch with pt to set up home O2 once pt home. Pt daughter and son in law in room and will provide transportation home. Pt and family agreeable to discharge plan.     Final Discharge Disposition Code 06 - home with home health care              Discharge Codes    No documentation.       Expected Discharge Date and Time     Expected Discharge Date Expected Discharge Time    Mar 10, 2018             Samantha Ontiveros RN

## 2018-03-10 NOTE — DISCHARGE PLACEMENT REQUEST
"Brandy Amado (72 y.o. Female)     To [facility]  From [facility]() [phone]      [address]  [address] [facility] [address]  Phone:  [phone]  Fax:   Date Ordered: Mar 10, 2018         Patient:  Brandy Amado MRN:  [id number]   [address] [address] :  1945  SSN:    Phone: [phone] Sex:  F     Weight: 56.6 kg (124 lb 12.8 oz)         Ht Readings from Last 1 Encounters:   18 157.5 cm (62\")         Walker               (Order ID: [id number])    Diagnosis:  Impaired functional mobility, balance, gait, and endurance (Z74.09 [ICD-10-CM] V49.89 [ICD-9-CM])  Severe malnutrition (E43 [ICD-10-CM] 261 [ICD-9-CM])  Suspected deep tissue injury (Z04.9 [ICD-10-CM] V71.9 [ICD-9-CM])   Quantity:  1     Equipment:  Walker Folding with Wheels  Length of Need (99 Months = Lifetime): 99 Months = Lifetime            Verbal Order Mode: Telephone with readback   Authorizing Provider: Samira Edmonds MD  Authorizing Provider's NPI: [id number]     Order Entered By: Samantha Ontiveros RN 3/10/2018 11:57 AM     Electronically signed by:               Date of Birth Social Security Number Address Home Phone MRN    1945  [address] [place] [address] [phone] [id number]    Alevism Marital Status          None        Admission Date Admission Type Admitting Provider Attending Provider Department, Room/Bed    3/8/18 Urgent Samira Edmonds MD Krill, Kaleigh, MD [facility] 4G, S461/1    Discharge Date Discharge Disposition Discharge Destination         Home or Self Care              Attending Provider:  Samira Edmonds MD    Allergies:  No Known Allergies    Isolation:  None   Infection:  None   Code Status:  FULL    Ht:  157.5 cm (62\")   Wt:  56.6 kg (124 lb 12.8 oz)    Admission Cmt:  None   Principal Problem:  Acute hypoxemic respiratory failure [J96.01]                 Active Insurance as of " 3/8/2018     Primary Coverage     Payor Plan Insurance Group Employer/Plan Group    MEDICARE MEDICARE A & B      Payor Plan Address Payor Plan Phone Number Effective From Effective To    [address] [phone] [address]     [place] [address]       Subscriber Name Subscriber Birth Date Member ID       ARCELIA AMADO 1945 [id number]           Secondary Coverage     Payor Plan Insurance Group Employer/Plan Group    [place]      Payor Plan Address Payor Plan Phone Number Effective From Effective To    [address] [phone] 2018     [place] [address]       Subscriber Name Subscriber Birth Date Member ID       ARCELIA AMADO 1945 [id number]                 Emergency Contacts      (Rel.) Home Phone Work Phone Mobile Phone    Noe Amado (Spouse) [phone] -- --    LarryYvonne (Daughter) [phone] -- --               History & Physical      Jazlyn Parker MD at 3/8/2018  2:52 PM              [facility] Medicine Services  HISTORY AND PHYSICAL    Patient Name: Arcelia Amado  : 1945  MRN: [id number]  Primary Care Physician: Koko Melchor MD    Subjective   Subjective     Chief Complaint:  SOA    HPI:  Arcelia Amado is a 72 y.o. female with PMH of HTN, COPD and long history of smoking, still using E-cigarette, anxiety/depression, 17lb unintentional wt loss x 4mos.  Review of records shows was seen 18 in ED for Dyspnea, treated for acute bronchitis. CT of chest order 18 showed 6cm mass of inferior posterior mediastinum with compression effect on thoracic aorta referred to CTS, Dr Etienne for eval, planning bronch with bx    Pt arrived at preop today and noted to have significant dyspnea, hypoxic satting 81% on RA, placed on 3L NC improved to 93%, being admitted to hospitalist service for further w/u and management.    The patient is a fair historian, daughter present.  Patient reports progressive dyspnea  over the several weeks, significantly worsened over the past few days.  Reports significant dyspnea with minimal exertion, as well as dyspnea at rest, no PND.  Does not use supplemental O2 at home.  Over the past few days has been having to use rescue inhaler frequently with minimal improvement.  Some lightheadedness, weakness with activity.  Recent falls noted per daughter.  Poor po, decreased fluid intake and dry mouth, takes Ensure TID.  Patient denies cardiac history, takes metoprolol, Lasix, Vasotec reportedly for hypertension, however stopped taking all these medications one week ago after found to be hypotensive at CT clinic eval, SBP 70s.  Patient with a history of anxiety takes Xanax twice a day.  Denies nightsweats, chest pain, palpitations, N/V.         Wbc normal, , CO2 35, AST 54, ALT 88, A1c 6.8%. EKG showed sinus tachy with QTc 552.     Review of Systems   Constitutional: Positive for activity change, appetite change, fatigue and unexpected weight change. Negative for chills and fever.   HENT: Negative for rhinorrhea and sore throat.    Eyes: Negative.    Respiratory: Positive for shortness of breath and wheezing.    Cardiovascular: Negative for chest pain, palpitations and leg swelling.   Gastrointestinal: Negative for nausea and vomiting.   Endocrine: Negative for polydipsia and polyuria.   Genitourinary: Positive for decreased urine volume. Negative for difficulty urinating and dysuria.   Musculoskeletal: Positive for gait problem. Negative for myalgias.   Skin: Positive for wound (right thigh). Negative for rash.   Neurological: Positive for dizziness and weakness. Negative for syncope.   Hematological: Negative for adenopathy. Does not bruise/bleed easily.   Psychiatric/Behavioral: Positive for confusion (occasionally with visual hallucinations).        Anxiety        Otherwise 10-system ROS reviewed and is negative except as mentioned in the HPI.    Personal History     Past Medical  History:   Diagnosis Date   • Anxiety    • Depression    • Emphysema lung    • Fall     x2 within last 6 months and thinks medication related (xanax)    • [place] (hard of hearing)    • Hypertension        Past Surgical History:   Procedure Laterality Date   • VAGINAL DELIVERY      x4   No prior surgeries    Family History: family history includes COPD in her sister; Heart attack in her father; Lung cancer in her brother; No Known Problems in her daughter and son; Stroke in her mother.     Social History:  reports that she quit smoking about a year ago. Her smoking use included Cigarettes. She has a 72.00 pack-year smoking history. She has never used smokeless tobacco. She reports that she does not drink alcohol or use illicit drugs.  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   3/8/2018 at Unknown time   • ALPRAZolam (XANAX) 1 MG tablet Take 1 mg by mouth 2 (Two) Times a Day As Needed for Anxiety.   3/8/2018 at Unknown time   • amLODIPine (NORVASC) 10 MG tablet Take 10 mg by mouth Daily.   Past Week at Unknown time   • enalapril (VASOTEC) 10 MG tablet Take 10 mg by mouth 2 (Two) Times a Day.   Past Week at Unknown time   • FLUoxetine (PROzac) 20 MG capsule Take 60 mg by mouth Daily.   3/7/2018 at Unknown time   • furosemide (LASIX) 20 MG tablet Take 20 mg by mouth Daily.   Past Week at Unknown time   • metoprolol tartrate (LOPRESSOR) 50 MG tablet Take 25 mg by mouth 2 (Two) Times a Day.   Past Week at Unknown time   • calcipotriene (DOVONEX) 0.005 % cream Apply 1 application topically As Needed.   Unknown at Unknown time       No Known Allergies    Objective   Objective     Vital Signs:   Temp:  [98.3 °F (36.8 °C)] 98.3 °F (36.8 °C)  Heart Rate:   [101-118] 109  Resp:  [16] 16  BP: (100-134)/(82-88) 119/87        Physical Exam   Constitutional: ill appearing cachetic female awake, alert in moderate resp distress  Eyes: PERRLA, sclerae anicteric, no conjunctival injection  HENT: NCAT, mucous membranes dry  Neck: Supple, trachea midline  Respiratory: tachypneic, diminished throughout with diffuse expiratory wheezes, sattting 90%on 3L   Cardiovascular: tachy and slightly irregular, no murmurs, rubs, or gallops, palpable pedal pulses bilaterally  Gastrointestinal: Positive bowel sounds, soft, nontender, nondistended  Musculoskeletal: No bilateral ankle edema, no clubbing or cyanosis to extremities  Psychiatric: anxious affect, cooperative  Neurologic: [place], Oriented x 3, strength symmetric in all extremities, Cranial Nerves II-XII  grossly intact to confrontation, speech clear  Skin: ~10cm area of DTI to right lateral thigh w/o ulceration. No rashes  Results Reviewed:  I have personally reviewed current lab, radiology, and data and agree.      Results from last 7 days  Lab Units 03/08/18  0918   WBC 10*3/mm3 9.92   HEMOGLOBIN g/dL 13.7   HEMATOCRIT % 43.0   PLATELETS 10*3/mm3 334   INR  0.99       Results from last 7 days  Lab Units 03/08/18  1530 03/08/18  0918   SODIUM mmol/L  --  139   POTASSIUM mmol/L  --  4.1   CHLORIDE mmol/L  --  98*   CO2 mmol/L  --  35.0*   BUN mg/dL  --  15   CREATININE mg/dL  --  1.00   GLUCOSE mg/dL  --  181*   CALCIUM mg/dL  --  8.7   ALT (SGPT) U/L  --  88*   AST (SGOT) U/L  --  54*   TROPONIN I ng/mL 0.028 0.032     Estimated Creatinine Clearance: 44.1 mL/min (by C-G formula based on Cr of 1).  Brief Urine Lab Results     None        BNP   Date Value Ref Range Status   03/08/2018 106.0 (H) 0.0 - 100.0 pg/mL Final     Comment:     Results may be falsely decreased if patient taking Biotin.     No results found for: PHART  Imaging Results (last 24 hours)     Procedure Component Value Units Date/Time    XR Chest 1 View [[id number]]  Collected:  03/08/18 1645     Updated:  03/08/18 1645    Narrative:       EXAMINATION: XR CHEST 1 VW-03/08/2018:      INDICATION: SOA.      COMPARISON: NONE.     FINDINGS: Portable chest reveals patchy ill-defined opacification left  lung base with small left pleural effusion. The heart is borderline  enlarged. Underlying chronic and emphysematous changes seen diffusely  throughout the lung fields.           Impression:       Patchy ill-defined opacification left lung base with small  left pleural effusion. The heart is enlarged. Underlying chronic and  emphysematous changes seen within lung fields bilaterally.     D:  03/08/2018  E:  03/08/2018                      Assessment/Plan   Assessment / Plan     Hospital Problem List     6cm mass of inferior posterior mediastinum with some compression effect on thoracic aorta     Acute respiratory failure with hypoxia    Anxiety    Unintentional weight loss    Severe malnutrition    Chronic obstructive pulmonary disease with acute exacerbation    Congenital QT prolongation on electrocardiogram (ECG)    Volume depletion    Suspected deep tissue injury right lateral thigh POA    T2DM (type 2 diabetes mellitus)            Assessment & Plan:    Acute hypoxic resp failure/COPD EXAC  -currently on 3L NC maintaining low 90s  -2/8/18 CT of chest showed 6cm mediastinal mass with some compression of aorta  -ordered stat CTA of chest. NO PE, CONCERN, MEDIASTINAL MASS ENHANCED ON DELAYED IMAGES, CONCERN FOR POSS LEAKAGE FROM AORTA; DR ETIENNE MADE AWARE  -Adding on CT of abd and pelvis with and without as previously schedule for outpt today.   -lactate and BNP normal, reviewed CXR chronic changes noted no infiltrates or effusion  -ECHO ordered  -STAT nebs, METHYLprednisoLONE 60mg BID      6cm Mediastinal mass  -Consult CTS, Dr Etienne planning bronch with bx    Tachycardic, noted QT prolongation on EKG this a.m.   -EKG Qtc 552 now 602, will repeat EKG in 1 hr  -home metroprolol with  holding parameter for hypotension  -hold SSRI  -K+, Mg, TSH, and troponin x2 normal   -Cardiology consult for preop eval  CONCERNED FOR POSS NEW ONSET AFIB/POSS PROLONGED QTC. CARD EVALUATED, CLEARED FOR SURGERY TOMORROW.    HTN, recent hypotension  -continue to hold norvasc, enalapril and lasix    Anxiety/depression  -continue home xanax, holding SSRI 2nd to QTc    New dx T2DM  -A1c 6.8%  -SSI, DM educator  -check TSH    Weakness, recent fall  -fall precautions  -PT tomorrow    Decreased UOP  -bladder scan, reports only voiding once today    Suspected DTI to right lateral thigh POA  -WOC consult    Nicotine dependence  -nictone patch.     Severe malnutrition  nutrition consult  DVT prophylaxis:heparin sq  q12hr for now (pending findings on CTA and EKG, may hold in a.m. For bronch)    CODE STATUS:  Full Code        Brief Attending Admission Attestation     I have seen and examined the patient, performing an independent face-to-face diagnostic evaluation with plan of care reviewed and developed with the advanced practice clinician (APC).      Brief Summary Statement/HPI:   Brandy Amado is a 72 y.o. female with history of mediastinal mass that is compressing on the aorta, being followed by Dr. Etienne and was scheduled to half an elective surgery tomorrow.  She also has a history of heavy tobacco abuse, COPD/emphysema, hypertension, and newly diagnosed diabetes type 2. Patient presented for preop evaluation today and was found to be hypoxic, oxygen saturation as low as 80% on room air, so hospitalist was asked to admit for further evaluation.  EKG also showed sinus tachycardia, quality was poor, concern for possible new onset A. fib.  QT C was also prolonged.      Attending Physical Exam:  General Assessment: No acute cardiopulmonary distress.  Frail/thin    HEENT: NCAT, PERRL, MM MOIST    Neck: Supple, no carotid bruit bilat    CVS: RRR, S1S2 normal, no murmurs    Resp: Very diminished airflow bilaterally, no  crackles, no wheezing, respiration is moderately labored, oxygen saturation 97% on 4 L at the moment     Abd: soft, NT, ND, normal BS, no guarding or peritoneal signs    Ext: No edema, both calves are symmetric and NTTP    Neuro: No A show asymmetry, speech clear    Skin: W/D/I. No rash.    Psych: Affect is appropriate    Brief Assessment/Plan :  See above for further detailed assessment and plan developed with APC which I have reviewed and/or edited.      Electronically signed by Jazlyn Parker MD, 03/08/18, 8:52 PM.             Admission Status:  I believe this patient meets INPATIENT status due to the need for care which can only be reasonably provided in an hospital setting such as aggressive/expedited ancillary services and/or consultation services, the necessity for IV medications, close physician monitoring and/or the possible need for procedures.  In such, I feel patient’s risk for adverse outcomes and need for care warrant INPATIENT evaluation and predict the patient’s care encounter to likely last beyond 2 midnights.      Electronically signed by Casie M Mayne, PA-C, 03/08/18, 2:52 PM.        Electronically signed by Jazlyn Parker MD at 3/8/2018  9:00 PM       Discharge Summary     No notes of this type exist for this encounter.

## 2018-03-10 NOTE — DISCHARGE PLACEMENT REQUEST
"Brandy Amado (72 y.o. Female)     To [facility]  From [facility]() [phone]      [address]  [address] [facility] [address]  Phone:  [phone]  Fax:   Date Ordered: Mar 10, 2018         Patient:  Brandy Amado MRN:  [id number]   [address] [id number] :  1945  SSN:    Phone: [phone] Sex:  F     Weight: 56.6 kg (124 lb 12.8 oz)         Ht Readings from Last 1 Encounters:   18 157.5 cm (62\")         Oxygen Therapy         (Order ID: [id number])    Diagnosis:  Acute respiratory failure with hypoxia (J96.01 [ICD-10-CM] 518.81 [ICD-9-CM])  Chronic obstructive pulmonary disease with acute exacerbation (J44.1 [ICD-10-CM] 491.21 [ICD-9-CM])  Pleural effusion (J90 [ICD-10-CM] 511.9 [ICD-9-CM])  Acute hypoxemic respiratory failure (J96.01 [ICD-10-CM] 518.81 [ICD-9-CM])   Quantity:  1     Delivery Modality: Nasal Cannula  Liters Per Minute: 2  Duration: Continuous  Equipment:  Oxygen Concentrator &  &  Portable Gaseous Oxygen System & Portable Oxygen Contents Gaseous &  Conserving Regulator  Length of Need (99 Months = Lifetime): 99 Months = Lifetime            Verbal Order Mode: Telephone with readback   Authorizing Provider: Samira Edmonds MD  Authorizing Provider's NPI: [id number]     Order Entered By: Samantha Ontiveros RN 3/10/2018 11:51 AM     Electronically signed by:                   Date of Birth Social Security Number Address Home Phone MRN    1945  [address] [place], [place] [id number] [phone] [id number]    B[employer] Marital Status          None        Admission Date Admission Type Admitting Provider Attending Provider Department, Room/Bed    3/8/18 Urgent Samira Edmonds MD Krill, Kaleigh, MD [facility] 4G, R549/1    Discharge Date Discharge Disposition Discharge Destination         Home or Self Care              Attending Provider:  Samira Edmonds MD  " "  Allergies:  No Known Allergies    Isolation:  None   Infection:  None   Code Status:  FULL    Ht:  157.5 cm (62\")   Wt:  56.6 kg (124 lb 12.8 oz)    Admission Cmt:  None   Principal Problem:  Acute hypoxemic respiratory failure [J96.01]                 Active Insurance as of 3/8/2018     Primary Coverage     Payor Plan Insurance Group Employer/Plan Group    MEDICARE MEDICARE A & B      Payor Plan Address Payor Plan Phone Number Effective From Effective To    [address] [phone] 2011     [place] [address]       Subscriber Name Subscriber Birth Date Member ID       ARCELIA AMADO 1945 [id number]           Secondary Coverage     Payor Plan Insurance Group Employer/Plan Group    [place]      Payor Plan Address Payor Plan Phone Number Effective From Effective To    [address] [phone] 2018     [place] [address]       Subscriber Name Subscriber Birth Date Member ID       ARCELIA AMADO 1945 [id number]                 Emergency Contacts      (Rel.) Home Phone Work Phone Mobile Phone    Noe Amado (Spouse) [phone] -- --    LarryYvonne pascual (Daughter) [phone] -- --               History & Physical      Jazlyn Parker MD at 3/8/2018  2:52 PM              [facility] Medicine Services  HISTORY AND PHYSICAL    Patient Name: Arcelia Amado  : 1945  MRN: [id number]  Primary Care Physician: Koko Melchor MD    Subjective   Subjective     Chief Complaint:  SOA    HPI:  Arcelia Amado is a 72 y.o. female with PMH of HTN, COPD and long history of smoking, still using E-cigarette, anxiety/depression, 17lb unintentional wt loss x 4mos.  Review of records shows was seen 18 in ED for Dyspnea, treated for acute bronchitis. CT of chest order 18 showed 6cm mass of inferior posterior mediastinum with compression effect on thoracic aorta referred to CTS, Dr Etienne for eval, planning bronch with bx    Pt " arrived at preop today and noted to have significant dyspnea, hypoxic satting 81% on RA, placed on 3L NC improved to 93%, being admitted to hospitalist service for further w/u and management.    The patient is a fair historian, daughter present.  Patient reports progressive dyspnea over the several weeks, significantly worsened over the past few days.  Reports significant dyspnea with minimal exertion, as well as dyspnea at rest, no PND.  Does not use supplemental O2 at home.  Over the past few days has been having to use rescue inhaler frequently with minimal improvement.  Some lightheadedness, weakness with activity.  Recent falls noted per daughter.  Poor po, decreased fluid intake and dry mouth, takes Ensure TID.  Patient denies cardiac history, takes metoprolol, Lasix, Vasotec reportedly for hypertension, however stopped taking all these medications one week ago after found to be hypotensive at CT clinic eval, SBP 70s.  Patient with a history of anxiety takes Xanax twice a day.  Denies nightsweats, chest pain, palpitations, N/V.         Wbc normal, , CO2 35, AST 54, ALT 88, A1c 6.8%. EKG showed sinus tachy with QTc 552.     Review of Systems   Constitutional: Positive for activity change, appetite change, fatigue and unexpected weight change. Negative for chills and fever.   HENT: Negative for rhinorrhea and sore throat.    Eyes: Negative.    Respiratory: Positive for shortness of breath and wheezing.    Cardiovascular: Negative for chest pain, palpitations and leg swelling.   Gastrointestinal: Negative for nausea and vomiting.   Endocrine: Negative for polydipsia and polyuria.   Genitourinary: Positive for decreased urine volume. Negative for difficulty urinating and dysuria.   Musculoskeletal: Positive for gait problem. Negative for myalgias.   Skin: Positive for wound (right thigh). Negative for rash.   Neurological: Positive for dizziness and weakness. Negative for syncope.   Hematological: Negative  for adenopathy. Does not bruise/bleed easily.   Psychiatric/Behavioral: Positive for confusion (occasionally with visual hallucinations).        Anxiety        Otherwise 10-system ROS reviewed and is negative except as mentioned in the HPI.    Personal History     Past Medical History:   Diagnosis Date   • Anxiety    • Depression    • Emphysema lung    • Fall     x2 within last 6 months and thinks medication related (xanax)    • Cahto (hard of hearing)    • Hypertension        Past Surgical History:   Procedure Laterality Date   • VAGINAL DELIVERY      x4   No prior surgeries    Family History: family history includes COPD in her sister; Heart attack in her father; Lung cancer in her brother; No Known Problems in her daughter and son; Stroke in her mother.     Social History:  reports that she quit smoking about a year ago. Her smoking use included Cigarettes. She has a 72.00 pack-year smoking history. She has never used smokeless tobacco. She reports that she does not drink alcohol or use illicit drugs.  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   3/8/2018 at Unknown time   • ALPRAZolam (XANAX) 1 MG tablet Take 1 mg by mouth 2 (Two) Times a Day As Needed for Anxiety.   3/8/2018 at Unknown time   • amLODIPine (NORVASC) 10 MG tablet Take 10 mg by mouth Daily.   Past Week at Unknown time   • enalapril (VASOTEC) 10 MG tablet Take 10 mg by mouth 2 (Two) Times a Day.   Past Week at Unknown time   • FLUoxetine (PROzac) 20 MG capsule Take 60 mg by mouth Daily.   3/7/2018 at Unknown time   • furosemide (LASIX) 20 MG tablet Take 20 mg by mouth Daily.   Past Week at Unknown time   • metoprolol tartrate (LOPRESSOR) 50 MG tablet Take 25 mg by mouth 2  (Two) Times a Day.   Past Week at Unknown time   • calcipotriene (DOVONEX) 0.005 % cream Apply 1 application topically As Needed.   Unknown at Unknown time       No Known Allergies    Objective   Objective     Vital Signs:   Temp:  [98.3 °F (36.8 °C)] 98.3 °F (36.8 °C)  Heart Rate:  [101-118] 109  Resp:  [16] 16  BP: (100-134)/(82-88) 119/87        Physical Exam   Constitutional: ill appearing cachetic female awake, alert in moderate resp distress  Eyes: PERRLA, sclerae anicteric, no conjunctival injection  HENT: NCAT, mucous membranes dry  Neck: Supple, trachea midline  Respiratory: tachypneic, diminished throughout with diffuse expiratory wheezes, sattting 90%on 3L   Cardiovascular: tachy and slightly irregular, no murmurs, rubs, or gallops, palpable pedal pulses bilaterally  Gastrointestinal: Positive bowel sounds, soft, nontender, nondistended  Musculoskeletal: No bilateral ankle edema, no clubbing or cyanosis to extremities  Psychiatric: anxious affect, cooperative  Neurologic: [place], Oriented x 3, strength symmetric in all extremities, Cranial Nerves II-XII  grossly intact to confrontation, speech clear  Skin: ~10cm area of DTI to right lateral thigh w/o ulceration. No rashes  Results Reviewed:  I have personally reviewed current lab, radiology, and data and agree.      Results from last 7 days  Lab Units 03/08/18  0918   WBC 10*3/mm3 9.92   HEMOGLOBIN g/dL 13.7   HEMATOCRIT % 43.0   PLATELETS 10*3/mm3 334   INR  0.99       Results from last 7 days  Lab Units 03/08/18  1530 03/08/18  0918   SODIUM mmol/L  --  139   POTASSIUM mmol/L  --  4.1   CHLORIDE mmol/L  --  98*   CO2 mmol/L  --  35.0*   BUN mg/dL  --  15   CREATININE mg/dL  --  1.00   GLUCOSE mg/dL  --  181*   CALCIUM mg/dL  --  8.7   ALT (SGPT) U/L  --  88*   AST (SGOT) U/L  --  54*   TROPONIN I ng/mL 0.028 0.032     Estimated Creatinine Clearance: 44.1 mL/min (by C-G formula based on Cr of 1).  Brief Urine Lab Results     None        BNP   Date Value Ref  Range Status   03/08/2018 106.0 (H) 0.0 - 100.0 pg/mL Final     Comment:     Results may be falsely decreased if patient taking Biotin.     No results found for: PHART  Imaging Results (last 24 hours)     Procedure Component Value Units Date/Time    XR Chest 1 View [[id number]] Collected:  03/08/18 1645     Updated:  03/08/18 1645    Narrative:       EXAMINATION: XR CHEST 1 VW-03/08/2018:      INDICATION: SOA.      COMPARISON: NONE.     FINDINGS: Portable chest reveals patchy ill-defined opacification left  lung base with small left pleural effusion. The heart is borderline  enlarged. Underlying chronic and emphysematous changes seen diffusely  throughout the lung fields.           Impression:       Patchy ill-defined opacification left lung base with small  left pleural effusion. The heart is enlarged. Underlying chronic and  emphysematous changes seen within lung fields bilaterally.     D:  03/08/2018  E:  03/08/2018                      Assessment/Plan   Assessment / Plan     Hospital Problem List     6cm mass of inferior posterior mediastinum with some compression effect on thoracic aorta     Acute respiratory failure with hypoxia    Anxiety    Unintentional weight loss    Severe malnutrition    Chronic obstructive pulmonary disease with acute exacerbation    Congenital QT prolongation on electrocardiogram (ECG)    Volume depletion    Suspected deep tissue injury right lateral thigh POA    T2DM (type 2 diabetes mellitus)            Assessment & Plan:    Acute hypoxic resp failure/COPD EXAC  -currently on 3L NC maintaining low 90s  -2/8/18 CT of chest showed 6cm mediastinal mass with some compression of aorta  -ordered stat CTA of chest. NO PE, CONCERN, MEDIASTINAL MASS ENHANCED ON DELAYED IMAGES, CONCERN FOR POSS LEAKAGE FROM AORTA; DR LAU MADE AWARE  -Adding on CT of abd and pelvis with and without as previously schedule for outpt today.   -lactate and BNP normal, reviewed CXR chronic changes noted no  infiltrates or effusion  -ECHO ordered  -STAT nebs, METHYLprednisoLONE 60mg BID      6cm Mediastinal mass  -Consult CTS, Dr Etienne planning bronch with bx    Tachycardic, noted QT prolongation on EKG this a.m.   -EKG Qtc 552 now 602, will repeat EKG in 1 hr  -home metroprolol with holding parameter for hypotension  -hold SSRI  -K+, Mg, TSH, and troponin x2 normal   -Cardiology consult for preop eval  CONCERNED FOR POSS NEW ONSET AFIB/POSS PROLONGED QTC. CARD EVALUATED, CLEARED FOR SURGERY TOMORROW.    HTN, recent hypotension  -continue to hold norvasc, enalapril and lasix    Anxiety/depression  -continue home xanax, holding SSRI 2nd to QTc    New dx T2DM  -A1c 6.8%  -SSI, DM educator  -check TSH    Weakness, recent fall  -fall precautions  -PT tomorrow    Decreased UOP  -bladder scan, reports only voiding once today    Suspected DTI to right lateral thigh POA  -WOC consult    Nicotine dependence  -nictone patch.     Severe malnutrition  nutrition consult  DVT prophylaxis:heparin sq  q12hr for now (pending findings on CTA and EKG, may hold in a.m. For bronch)    CODE STATUS:  Full Code        Brief Attending Admission Attestation     I have seen and examined the patient, performing an independent face-to-face diagnostic evaluation with plan of care reviewed and developed with the advanced practice clinician (APC).      Brief Summary Statement/HPI:   Brandy Amado is a 72 y.o. female with history of mediastinal mass that is compressing on the aorta, being followed by Dr. Etienne and was scheduled to half an elective surgery tomorrow.  She also has a history of heavy tobacco abuse, COPD/emphysema, hypertension, and newly diagnosed diabetes type 2. Patient presented for preop evaluation today and was found to be hypoxic, oxygen saturation as low as 80% on room air, so hospitalist was asked to admit for further evaluation.  EKG also showed sinus tachycardia, quality was poor, concern for possible new onset A. fib.  QT C  was also prolonged.      Attending Physical Exam:  General Assessment: No acute cardiopulmonary distress.  Frail/thin    HEENT: NCAT, PERRL, MM MOIST    Neck: Supple, no carotid bruit bilat    CVS: RRR, S1S2 normal, no murmurs    Resp: Very diminished airflow bilaterally, no crackles, no wheezing, respiration is moderately labored, oxygen saturation 97% on 4 L at the moment     Abd: soft, NT, ND, normal BS, no guarding or peritoneal signs    Ext: No edema, both calves are symmetric and NTTP    Neuro: No A show asymmetry, speech clear    Skin: W/D/I. No rash.    Psych: Affect is appropriate    Brief Assessment/Plan :  See above for further detailed assessment and plan developed with APC which I have reviewed and/or edited.      Electronically signed by Jazlyn Parker MD, 03/08/18, 8:52 PM.             Admission Status:  I believe this patient meets INPATIENT status due to the need for care which can only be reasonably provided in an hospital setting such as aggressive/expedited ancillary services and/or consultation services, the necessity for IV medications, close physician monitoring and/or the possible need for procedures.  In such, I feel patient’s risk for adverse outcomes and need for care warrant INPATIENT evaluation and predict the patient’s care encounter to likely last beyond 2 midnights.      Electronically signed by Casie M Mayne, PA-C, 03/08/18, 2:52 PM.        Electronically signed by Jazlyn Parker MD at 3/8/2018  9:00 PM       Discharge Summary     No notes of this type exist for this encounter.        03/10/18 0922  --resting  --  --  --  room air   86

## 2018-03-10 NOTE — DISCHARGE SUMMARY
[facility] Medicine Services  DISCHARGE SUMMARY    Patient Name: Brandy Amado  : 1945  MRN: [id number]    Date of Admission: 3/8/2018  Date of Discharge:  3/10/2018  Length of Stay: 2  Primary Care Physician: Koko Melchor MD    Consults     Date and Time Order Name Status Description    3/9/2018 0030 Inpatient Consult to Cardiothoracic Surgery      3/8/2018 1637 Inpatient Cardiology Consult Completed         Hospital Course     Presenting Problem:   Acute respiratory failure with hypoxia [J96.01]    Active Hospital Problems (** Indicates Principal Problem)    Diagnosis Date Noted   • **Acute hypoxemic respiratory failure [J96.01] 03/10/2018   • Pleural effusion [J90] 03/10/2018   • Acute respiratory failure with hypoxia [J96.01] 2018   • Anxiety [F41.9] 2018   • Unintentional weight loss [R63.4] 2018   • Severe malnutrition [E43] 2018   • Chronic obstructive pulmonary disease with acute exacerbation [J44.1] 2018   • Volume depletion [E86.9] 2018   • Suspected deep tissue injury right lateral thigh POA [Z04.9] 2018   • T2DM (type 2 diabetes mellitus) [E11.9] 2018   • 6cm mass of inferior posterior mediastinum with some compression effect on thoracic aorta  [J98.59] 2018      Resolved Hospital Problems    Diagnosis Date Noted Date Resolved   • Congenital QT prolongation on electrocardiogram (ECG) [I45.81] 2018          Hospital Course:  Brandy Amado is a 72 y.o. female with mediastinal mass who was scheduled to have bronch with EBUS was found to be newly hypoxic.     Pt was admitted and underwent thoracentesis with 1.1L removed from L chest. Preliminary fluid studies exudative with path pending. Pleural fluid albumin pending, which could change diagnosis. Pt was discharged home on O2. For her COPD exacerbation, she was discharged home on prednisone, Symbicort and Duonebs.      Pt is to call Dr. Etienne's  office next week to schedule for bronch and EBUS.      Day of Discharge     HPI:   Pt had no complaints. Daughter and son-in-law at bedside who will stay with her. Pt denies cough, CP or being SOB.    Review of Systems  Gen- No fevers or chills  CV- No chest pain or palpitations  Resp- No cough or dyspnea  GI- No N/V/D or abd pain    Otherwise ROS is negative except as mentioned in the HPI.    Vital Signs:   Temp:  [97.7 °F (36.5 °C)-98.5 °F (36.9 °C)] 98.5 °F (36.9 °C)  Heart Rate:  [79-93] 80  Resp:  [12-18] 16  BP: ()/(56-86) 120/86     Physical Exam:  Constitutional: No acute distress, awake, alert on 2LNC  Eyes: PERRLA, sclerae anicteric, no conjunctival injection  HENT: NCAT, mucous membranes moist  Neck: Supple, no thyromegaly, no lymphadenopathy, trachea midline  Respiratory: Expiratory wheezing posteriorly on R, nonlabored respirations   Cardiovascular: RRR, no murmurs, rubs, or gallops, palpable pedal pulses bilaterally  Gastrointestinal: Positive bowel sounds, soft, nontender, nondistended  Musculoskeletal: No bilateral ankle edema, no clubbing or cyanosis to extremities  Psychiatric: Appropriate affect, cooperative  Neurologic: Oriented x 3, strength symmetric in all extremities, Cranial Nerves grossly intact to confrontation, speech clear  Skin: No rashes    Pertinent  and/or Most Recent Results         Results from last 7 days  Lab Units 03/10/18  0617 03/09/18  0624 03/08/18  0918   WBC 10*3/mm3 7.26 5.98 9.92   HEMOGLOBIN g/dL 11.5 12.2 13.7   HEMATOCRIT % 37.8 40.2 43.0   PLATELETS 10*3/mm3 298 324 334   SODIUM mmol/L 138 139 139   POTASSIUM mmol/L 4.3 4.4 4.1   CHLORIDE mmol/L 105 98* 98*   CO2 mmol/L 30.0 33.0* 35.0*   BUN mg/dL 16 11 15   CREATININE mg/dL 0.70 0.70 1.00   GLUCOSE mg/dL 91 153* 181*   CALCIUM mg/dL 7.5* 7.8* 8.7       Results from last 7 days  Lab Units 03/09/18  0624 03/08/18  0918   BILIRUBIN mg/dL 0.2* 0.4   ALK PHOS U/L 92 114*   ALT (SGPT) U/L 69* 88*   AST (SGOT) U/L  52* 54*   PROTIME Seconds  --  10.4   INR   --  0.99   APTT seconds  --  26.6           Invalid input(s): TG, LDLCALC, LDLREALC    Results from last 7 days  Lab Units 03/08/18  1530 03/08/18  0918   TSH mIU/mL 2.641  --    HEMOGLOBIN A1C %  --  6.20*   BNP pg/mL  --  106.0*   TROPONIN I ng/mL 0.028 0.032     Brief Urine Lab Results  (Last result in the past 365 days)      Color   Clarity   Blood   Leuk Est   Nitrite   Protein   CREAT   Urine HCG        03/08/18 2106 Yellow Cloudy(A) Negative Negative Negative Trace(A)               Microbiology Results Abnormal     Procedure Component Value - Date/Time    Anaerobic Culture - Pleural Fluid, Pleural Cavity [[id number]]  (Normal) Collected:  03/09/18 1355    Lab Status:  Preliminary result Specimen:  Pleural Fluid from Pleural Cavity Updated:  03/10/18 1256     Culture No anaerobes isolated    Body Fluid Culture - Body Fluid, Pleural Cavity [[id number]]  (Normal) Collected:  03/09/18 1355    Lab Status:  Preliminary result Specimen:  Body Fluid from Pleural Cavity Updated:  03/10/18 0811     BF Culture No growth     Gram Stain Result Few (2+) WBCs seen      No organisms seen    KOH Prep - Body Fluid, Chest [[id number]]  (Normal) Collected:  03/09/18 1355    Lab Status:  Final result Specimen:  Body Fluid from Chest Updated:  03/09/18 1630     KOH Prep No yeast or hyphal elements seen          Imaging Results (all)     Procedure Component Value Units Date/Time    CT Head With & Without Contrast [[id number]] Collected:  03/10/18 1302     Updated:  03/10/18 1302    Narrative:       EXAMINATION: CT HEAD WWO CONTRAST - 03/10/2018      INDICATION: Dizziness; Z74.09-Other reduced mobility.     TECHNIQUE: CT data set of the brain was performed without and with  intravenous contrast.     The radiation dose reduction device was turned on for each scan per the  ALARA (As Low as Reasonably Achievable) protocol.     COMPARISON: None.     FINDINGS:   1. Precontrast datasets demonstrate  normal ocular lens and conal  contents. The foramen magnum is clear. Basal cisterns are clear.  Ventricular system is normal. Central or peripheral cortical atrophy is  not identified.     2. There is low-attenuation extensively throughout the white matter  suggesting marked small vessel microangiopathy.     3. The paranasal sinuses are clear. Mastoids are unremarkable. Calvarial  bones are within normal limits.     4. Contrast enhanced datasets are negative. There is no focal enhancing  lesion. Primary tumor or enhancing metastatic deposits are not currently  identified.     5. In the right frontal gray-white interface, there is a small density  of increased attenuation without evidence of mass effect. This is a  small focus of minimal enhancement at the gray-white interface of the  right frontal lobe measuring no more than 3 x 4 mm. It can be followed  conservatively although it is low probability for malignancy at this  time. No other area of cryptic enhancement is identified. There is no  edema, mass or midline shift.       Impression:          There is marked low-attenuation microangiopathy extensively throughout  the white matter suggesting severe small vessel disease.     The only abnormal area of enhancement is a 3 x 4 mm triangular  enhancement focus at the gray-white interface of the right frontal lobe  not surrounded by edema and exhibiting no mass effect. This will need to  be carefully followed. This would not translate to the patient's  symptoms of dizziness. The posterior fossa is unremarkable.     There is no overall mass effect or hemorrhage.     Therefore, if the patient's condition is stable, a follow-up CT data set  of the brain is recommended in no greater than 3 months.     Another consideration would be that of MR datasets to evaluate the small  focus of the gray-white interface of the right frontal region.     DICTATED:     03/10/2018  EDITED/ls :     03/10/2018        XR Chest 1 View  [[id number]] Updated:  03/10/18 0838    CT Guided Thoracentesis [[id number]] Collected:  03/09/18 1616     Updated:  03/09/18 1746    Narrative:       EXAMINATION: CT-GUIDED LEFT THORACENTESIS, CATHETER DRAINAGE LEFT  PLEURAL SPACE - 03/09/2018     HISTORY: Left pleural effusion, no recent surgery, etiology of fluid  indeterminate. Exertional dyspnea.     Catheter thoracentesis for therapeutic and diagnostic purposes.     TECHNIQUE: Patient has no known allergies.     The clotting profile is normal. The PT is 10.4, INR 0.99, PTT 26.6,  platelets 334,000.     FINDINGS:  1. The procedure, risks, complications and side effects of CT-guided  catheter drainage of the left chest were discussed and reviewed in  detail with the patient including possible risks and complications which  include pneumothorax, bleeding, infection, pleural injury, injury or  laceration of the intercostal artery with massive bleeding, puncture of  the lung, air embolization, and other possible risks and complications.     The patient understood and consented.     2. With the patient in the supine position, from the left posterior  axillary line, under meticulous CT guidance, sterile technique and local  anesthesia, an 8.5 Armenian catheter was placed intrinsic to the left  lower posterior pleural space.     A total of 1125 mL of slightly turbid xanthochromic fluid was extracted  from the left pleural space.     The left pleural space was tapped completely dry and the left lung  expanded completely after the thoracentesis.     3. The fluid was carefully prepared for the laboratory for all studies  ordered by the attending physician and the fluid and samples were  hand-delivered to the laboratory for immediate study.  4. Post thoracentesis images reveal no pneumothorax, no bleeding and no  acute complication. Catheter was removed uneventfully.       Impression:          Successful CT-guided catheter thoracentesis of the left chest has been  performed  and tolerated well tapping the left chest essentially dry as  described above.     Fluid was hand-delivered to the laboratory for all diagnostic studies  ordered by the attending physician.     This most pleasant patient tolerated the procedure well. There were no  complications. She was taken back to her room in satisfactory and stable  condition.     DICTATED:     03/09/2018  EDITED    :     03/09/2018      This report was finalized on 3/9/2018 5:44 PM by Dr. Pj Kramer MD.       XR Chest 1 View [[id number]] Collected:  03/08/18 1645     Updated:  03/09/18 0941    Narrative:       EXAMINATION: XR CHEST 1 VW-03/08/2018:      INDICATION: SOA.      COMPARISON: NONE.     FINDINGS: Portable chest reveals patchy ill-defined opacification left  lung base with small left pleural effusion. The heart is borderline  enlarged. Underlying chronic and emphysematous changes seen diffusely  throughout the lung fields.           Impression:       Patchy ill-defined opacification left lung base with small  left pleural effusion. The heart is enlarged. Underlying chronic and  emphysematous changes seen within lung fields bilaterally.     D:  03/08/2018  E:  03/08/2018     This report was finalized on 3/9/2018 9:39 AM by Dr. Liliya Pugh MD.       CT Abdomen Pelvis With & Without Contrast [[id number]] Collected:  03/08/18 1636     Updated:  03/08/18 2053    Narrative:       EXAM:    CT Abdomen and Pelvis Without and With Intravenous Contrast    CLINICAL HISTORY:    72 years old, female; Signs and symptoms; Other: Mass, aortic compression;   Additional info: Chest mass with aortic compression    TECHNIQUE:    Axial computed tomography images of the abdomen and pelvis without and with   intravenous contrast.  All CT scans at this facility use one or more dose   reduction techniques, viz.: automated exposure control; ma/kV adjustment per   patient size (including targeted exams where dose is matched to indication;   i.e. head);  or iterative reconstruction technique.    Coronal reformatted images were created and reviewed.    CONTRAST:    85 mL of Isovue 370 administered intravenously.    COMPARISON:    No relevant prior studies available.    FINDINGS:    Lower thorax:  On this examination which was obtained approximately 5 minutes   after the chest CT examination there is prominent inhomogeneous enhancement of   the mediastinal mass which is only partially visualized on this abdominal   examination. There is also a 4.3 cm enhancing focus projecting into the pleural   fluid at the left medial lung base and there are enhancing foci along the   inferior pleura. Since the mass drapes across the aorta with no discernible   interface the possibility of contrast leaking from the aorta into the mass must   be considered. Differential includes enhancing posterior mediastinal mass such   as a mass of neurogenic origin.  There is a moderate left pleural effusion.    Compressive atelectasis left lung base.     ABDOMEN:    Liver:  Unremarkable.  No mass.    Gallbladder and bile ducts:  Unremarkable.  No calcified stones.  No ductal   dilation.    Pancreas:  Unremarkable.  No mass.  No ductal dilation.    Spleen:  Unremarkable.  No splenomegaly.    Adrenals:  Unremarkable.  No mass.    Kidneys and ureters:  Unremarkable.  No solid mass.  No obstructing stones.    No hydronephrosis.    Stomach and bowel:  Unremarkable.    Appendix:  No findings to suggest acute appendicitis.     PELVIS:    Bladder:  Unremarkable.  No mass.  No stones.    Reproductive:  Unremarkable as visualized.     ABDOMEN and PELVIS:    Intraperitoneal space:  Unremarkable.  No free air.  No significant fluid   collection.    Bones/joints:  No acute fracture.  No dislocation.    Soft tissues:  Unremarkable.    Vasculature:  Unremarkable.  No abdominal aortic aneurysm.    Lymph nodes:  Unremarkable.  No enlarged lymph nodes.      Impression:         Prominent contrast enhancement of  the partially visualized mediastinal mass   and contrast enhancement along the inferior and medial aspects of the pleura.   Since the mass drapes over and is inseparable from the aorta the possibility of   contrast leaking from the aorta into the mass must be considered. Differential   includes an enhancing posterior mediastinal mass such as a mass of neurogenic   origin.      THIS REPORT CONTAINS FINDINGS THAT MAY BE CRITICAL TO PATIENT CARE. The   findings were verbally communicated via telephone conference with Chio Smith    at 8:93 PM EST on 3/8/2018. The findings were acknowledged and understood.      THIS DOCUMENT HAS BEEN ELECTRONICALLY SIGNED BY AMBER DON MD    CT Angiogram Chest With & Without Contrast [[id number]] Collected:  03/08/18 1551     Updated:  03/08/18 2030    Narrative:       EXAM:    CT Angiography Chest With Intravenous Contrast    CLINICAL HISTORY:    72 years old, female; Signs and symptoms; Dyspnea; Additional info: Dyspnea,   cardiac origin suspected    TECHNIQUE:    Axial computed tomographic angiography images of the chest with intravenous   contrast using pulmonary embolism protocol.  All CT scans at this facility use   one or more dose reduction techniques, viz.: automated exposure control; ma/kV   adjustment per patient size (including targeted exams where dose is matched to   indication; i.e. head); or iterative reconstruction technique.    MIP reconstructed images were created and reviewed.    CONTRAST:    85 mL of Isovue 370 administered intravenously.    COMPARISON:    CR - XR CHEST 1 VW 2018-03-08 15:30    FINDINGS:    Pulmonary arteries:  No evidence of pulmonary embolus.    Aorta:  No aortic aneurysm or dissection.    Lungs:  Moderate centrilobular emphysematous changes are present bilaterally.    Bibasilar nonspecific lung base density is present, consistent with dependent   atelectasis.  Compressive atelectasis left lung base.  No mass.    Pleural space:  No evidence  of pneumothorax.  There is a moderate left   pleural effusion.    Heart:  Normal cardiac size.  No abnormal pericardial fluid.  No evidence of   RV dysfunction.    Mediastinum:  Cystic posterior mediastinal mass which measures approximately   5.7 x 3.5 by 13 cm. The mass drapes over the anterior aorta and compresses the   left atrial and left lower lobe pulmonary vein. Considerations include such   things as bronchogenic cyst, duplication cyst, or neurenteric cyst.    Bones/joints:  No fractures are appreciated.  Age-appropriate degenerative   changes of the spine.  No dislocation.    Soft tissues:  Unremarkable.    Lymph nodes:  No significant adenopathy.    Kidneys and ureters:  There is a simple  cyst in the right kidney.    Other findings:  Mild dependent atelectatic changes.      Impression:       1.  No evidence of pulmonary embolus.  2.  No aortic aneurysm or dissection.  3.  Cystic posterior mediastinal mass. Differential include such things as   bronchogenic cyst, duplication cyst, or neurenteric cyst.    THIS DOCUMENT HAS BEEN ELECTRONICALLY SIGNED BY AMBER DON MD          Results for orders placed during the hospital encounter of 03/08/18   STAT Adult Transthoracic Echo Complete W/ Cont if Necessary Per Protocol    Narrative · Left ventricular systolic function is normal.  · Estimated EF appears to be in the range of 66 - 70%  · Left ventricular wall thickness is consistent with mild concentric   hypertrophy.  · Left ventricular diastolic dysfunction (grade I) consistent with   impaired relaxation.  · Mild tricuspid valve regurgitation is present.  · Estimated right ventricular systolic pressure from tricuspid   regurgitation is mildly elevated (35-45 mmHg).           Order Current Status    AFB Culture - Body Fluid, Pleural Cavity In process    Cholesterol, Body Fluid - Pleural Fluid, Pleural Cavity In process    Fungus Culture - Body Fluid, Pleural Cavity In process    Non-gynecologic Cytology In  process    Anaerobic Culture - Pleural Fluid, Pleural Cavity Preliminary result    Body Fluid Culture - Body Fluid, Pleural Cavity Preliminary result        Discharge Details      Brandy Amado   Home Medication Instructions ANGEL:[id number]    Printed on:03/10/18 6241   Medication Information                      albuterol (PROVENTIL HFA;VENTOLIN HFA) 108 (90 Base) MCG/ACT inhaler  Inhale 2 puffs Every 4 (Four) Hours As Needed for Wheezing.             ALPRAZolam (XANAX) 1 MG tablet  Take 1 mg by mouth 2 (Two) Times a Day As Needed for Anxiety.             budesonide-formoterol (SYMBICORT) 80-4.5 MCG/ACT inhaler  Inhale 2 puffs 2 (Two) Times a Day.             calcipotriene (DOVONEX) 0.005 % cream  Apply 1 application topically As Needed.             FLUoxetine (PROzac) 20 MG capsule  Take 60 mg by mouth Daily.             folic acid (FOLVITE) 1 MG tablet  Take 1 tablet by mouth Daily.             ipratropium-albuterol (DUO-NEB) 0.5-2.5 mg/mL nebulizer  Take 3 mL by nebulization Every 6 (Six) Hours As Needed for Wheezing or Shortness of Air.             metoprolol tartrate (LOPRESSOR) 25 MG tablet  Take 0.5 tablets by mouth Every 12 (Twelve) Hours.             nicotine (NICODERM CQ) 21 MG/24HR patch  Place 1 patch on the skin Daily.             predniSONE (DELTASONE) 20 MG tablet  Take 2 tablets by mouth Daily With Breakfast for 2 days.                   Discharge Disposition:  Home or Self Care    Discharge Diet:  Diet Instructions     You are on a consistent carb diet.                  Discharge Activity:  Activity Instructions     Activity as tolerated.               No future appointments.    Additional Instructions for the Follow-ups that You Need to Schedule     Ambulatory Referral to Home Health    As directed      Face to Face Visit Date:  3/9/2018    Follow-up Provider for Plan of Care?:  I treated the patient in an acute care facility and will not continue treatment after discharge.    Follow-up  Provider:  BRITTANI SCHULTE [[id number]]    Reason/Clinical Findings:  weakness, deconditioning, decreased oxygen, low blood pressure    Describe mobility limitations that make leaving home difficult:  weakness, deconditioning, decreased oxygen, low blood pressure    Nursing/Therapeutic Services Requested:  Skilled Nursing Physical Therapy    Skilled nursing orders:  Medication education (emphysema management) O2 instruction    PT orders:  Therapeutic exercise Gait Training Strengthening Home safety assessment Transfer training    Weight Bearing Status:  Full Weight Bearing         Discharge Follow-up with PCP    As directed      Follow Up Details:  1-2 weeks         Discharge Follow-up with Specified Provider: Call Dr. Etienne's office next week; 1 Week    As directed      To:  Call Dr. Etienne's office next week    Follow Up:  1 Week    Follow Up Details:  To schedule for bronch and EBUS               Time Spent on Discharge:  >35min    Samira Edmonds MD  03/10/18  3:55 PM

## 2018-03-12 NOTE — PROGRESS NOTES
Continued Stay Note  [facility]     Patient Name: Brandy Amado  MRN: [id number]  Today's Date: 3/12/2018    Admit Date: 3/8/2018          Discharge Plan     Row Name 03/12/18 0851       Plan    Plan [facility]    Plan Comments Spoke with Osmel at [facility] (P: [phone]) and they received the order and patient has been admitted for home health. Yuki Massey RN [phone]              Discharge Codes    No documentation.       Expected Discharge Date and Time     Expected Discharge Date Expected Discharge Time    Mar 10, 2018             Yuki Massey RN

## 2018-03-13 PROBLEM — J96.00 RESPIRATORY FAILURE, ACUTE (HCC): Status: ACTIVE | Noted: 2018-01-01

## 2018-03-22 ENCOUNTER — EPISODE CHANGES (OUTPATIENT)
Dept: CASE MANAGEMENT | Facility: OTHER | Age: 73
End: 2018-03-22

## 2018-03-22 VITALS
OXYGEN SATURATION: 70 % | RESPIRATION RATE: 5 BRPM | BODY MASS INDEX: 24.84 KG/M2 | TEMPERATURE: 97.8 F | HEIGHT: 62 IN | SYSTOLIC BLOOD PRESSURE: 69 MMHG | WEIGHT: 135 LBS | DIASTOLIC BLOOD PRESSURE: 47 MMHG

## 2018-04-12 LAB
BACTERIA SPEC AEROBE CULT: NORMAL
FUNGUS SPEC CULT: NORMAL
FUNGUS SPEC CULT: NORMAL
FUNGUS SPEC FUNGUS STN: NORMAL

## 2018-04-20 LAB
FUNGUS WND CULT: NORMAL
MYCOBACTERIUM SPEC CULT: NORMAL
NIGHT BLUE STAIN TISS: NORMAL

## 2018-04-26 LAB
ACID FAST STN SPEC: NEGATIVE
BACTERIA SPEC AEROBE CULT: NEGATIVE
SPECIMEN PREPARATION: NORMAL

## 2024-10-23 NOTE — OUTREACH NOTE
Outreach with patient's  who reports patient has days where she feels better and days where she doesn't feel well.  She has no appetite, but he has encouraged her to use her albuterol.  He reports patient's CT showed a growth in her lung and PCP has made an appointment with specialist on 02/21/18.  No questions or needs identified at this time.  Will outreach again after appointment on 02/21/18.   . no